# Patient Record
Sex: MALE | Race: WHITE | NOT HISPANIC OR LATINO | Employment: FULL TIME | URBAN - METROPOLITAN AREA
[De-identification: names, ages, dates, MRNs, and addresses within clinical notes are randomized per-mention and may not be internally consistent; named-entity substitution may affect disease eponyms.]

---

## 2017-01-10 ENCOUNTER — ALLSCRIPTS OFFICE VISIT (OUTPATIENT)
Dept: OTHER | Facility: OTHER | Age: 57
End: 2017-01-10

## 2017-01-11 DIAGNOSIS — J32.9 CHRONIC SINUSITIS: ICD-10-CM

## 2017-01-11 DIAGNOSIS — J06.9 ACUTE UPPER RESPIRATORY INFECTION: ICD-10-CM

## 2017-01-14 ENCOUNTER — ALLSCRIPTS OFFICE VISIT (OUTPATIENT)
Dept: OTHER | Facility: OTHER | Age: 57
End: 2017-01-14

## 2017-06-15 ENCOUNTER — ALLSCRIPTS OFFICE VISIT (OUTPATIENT)
Dept: OTHER | Facility: OTHER | Age: 57
End: 2017-06-15

## 2017-06-15 DIAGNOSIS — R53.83 OTHER FATIGUE: ICD-10-CM

## 2017-06-15 DIAGNOSIS — Z12.5 ENCOUNTER FOR SCREENING FOR MALIGNANT NEOPLASM OF PROSTATE: ICD-10-CM

## 2017-06-15 DIAGNOSIS — R05.9 COUGH: ICD-10-CM

## 2017-07-27 ENCOUNTER — APPOINTMENT (OUTPATIENT)
Dept: LAB | Facility: CLINIC | Age: 57
End: 2017-07-27
Payer: COMMERCIAL

## 2017-07-27 ENCOUNTER — APPOINTMENT (OUTPATIENT)
Dept: RADIOLOGY | Facility: CLINIC | Age: 57
End: 2017-07-27
Payer: COMMERCIAL

## 2017-07-27 DIAGNOSIS — R53.83 OTHER FATIGUE: ICD-10-CM

## 2017-07-27 DIAGNOSIS — Z12.5 ENCOUNTER FOR SCREENING FOR MALIGNANT NEOPLASM OF PROSTATE: ICD-10-CM

## 2017-07-27 DIAGNOSIS — R05.9 COUGH: ICD-10-CM

## 2017-07-27 LAB
ALBUMIN SERPL BCP-MCNC: 3.6 G/DL (ref 3.5–5)
ALP SERPL-CCNC: 78 U/L (ref 46–116)
ALT SERPL W P-5'-P-CCNC: 28 U/L (ref 12–78)
ANION GAP SERPL CALCULATED.3IONS-SCNC: 3 MMOL/L (ref 4–13)
AST SERPL W P-5'-P-CCNC: 22 U/L (ref 5–45)
BASOPHILS # BLD AUTO: 0.04 THOUSANDS/ΜL (ref 0–0.1)
BASOPHILS NFR BLD AUTO: 1 % (ref 0–1)
BILIRUB SERPL-MCNC: 0.67 MG/DL (ref 0.2–1)
BUN SERPL-MCNC: 15 MG/DL (ref 5–25)
CALCIUM SERPL-MCNC: 8.8 MG/DL (ref 8.3–10.1)
CHLORIDE SERPL-SCNC: 107 MMOL/L (ref 100–108)
CHOLEST SERPL-MCNC: 198 MG/DL (ref 50–200)
CO2 SERPL-SCNC: 29 MMOL/L (ref 21–32)
CREAT SERPL-MCNC: 1.31 MG/DL (ref 0.6–1.3)
EOSINOPHIL # BLD AUTO: 0.3 THOUSAND/ΜL (ref 0–0.61)
EOSINOPHIL NFR BLD AUTO: 5 % (ref 0–6)
ERYTHROCYTE [DISTWIDTH] IN BLOOD BY AUTOMATED COUNT: 13.4 % (ref 11.6–15.1)
FOLATE SERPL-MCNC: 18.4 NG/ML (ref 3.1–17.5)
GFR SERPL CREATININE-BSD FRML MDRD: 60 ML/MIN/1.73SQ M
GLUCOSE P FAST SERPL-MCNC: 87 MG/DL (ref 65–99)
HCT VFR BLD AUTO: 49.5 % (ref 36.5–49.3)
HDLC SERPL-MCNC: 37 MG/DL (ref 40–60)
HGB BLD-MCNC: 16.5 G/DL (ref 12–17)
LDLC SERPL CALC-MCNC: 145 MG/DL (ref 0–100)
LYMPHOCYTES # BLD AUTO: 1.87 THOUSANDS/ΜL (ref 0.6–4.47)
LYMPHOCYTES NFR BLD AUTO: 28 % (ref 14–44)
MCH RBC QN AUTO: 30.8 PG (ref 26.8–34.3)
MCHC RBC AUTO-ENTMCNC: 33.3 G/DL (ref 31.4–37.4)
MCV RBC AUTO: 92 FL (ref 82–98)
MONOCYTES # BLD AUTO: 0.61 THOUSAND/ΜL (ref 0.17–1.22)
MONOCYTES NFR BLD AUTO: 9 % (ref 4–12)
NEUTROPHILS # BLD AUTO: 3.73 THOUSANDS/ΜL (ref 1.85–7.62)
NEUTS SEG NFR BLD AUTO: 57 % (ref 43–75)
NRBC BLD AUTO-RTO: 0 /100 WBCS
PLATELET # BLD AUTO: 176 THOUSANDS/UL (ref 149–390)
PMV BLD AUTO: 10.8 FL (ref 8.9–12.7)
POTASSIUM SERPL-SCNC: 5 MMOL/L (ref 3.5–5.3)
PROT SERPL-MCNC: 7.9 G/DL (ref 6.4–8.2)
PSA SERPL-MCNC: 0.6 NG/ML (ref 0–4)
RBC # BLD AUTO: 5.36 MILLION/UL (ref 3.88–5.62)
SODIUM SERPL-SCNC: 139 MMOL/L (ref 136–145)
T4 FREE SERPL-MCNC: 0.97 NG/DL (ref 0.76–1.46)
TRIGL SERPL-MCNC: 79 MG/DL
TSH SERPL DL<=0.05 MIU/L-ACNC: 2.74 UIU/ML (ref 0.36–3.74)
VIT B12 SERPL-MCNC: 676 PG/ML (ref 100–900)
WBC # BLD AUTO: 6.58 THOUSAND/UL (ref 4.31–10.16)

## 2017-07-27 PROCEDURE — 70220 X-RAY EXAM OF SINUSES: CPT

## 2017-07-27 PROCEDURE — 82607 VITAMIN B-12: CPT

## 2017-07-27 PROCEDURE — 80061 LIPID PANEL: CPT

## 2017-07-27 PROCEDURE — 84443 ASSAY THYROID STIM HORMONE: CPT

## 2017-07-27 PROCEDURE — 71020 HB CHEST X-RAY 2VW FRONTAL&LATL: CPT

## 2017-07-27 PROCEDURE — 80053 COMPREHEN METABOLIC PANEL: CPT

## 2017-07-27 PROCEDURE — 36415 COLL VENOUS BLD VENIPUNCTURE: CPT

## 2017-07-27 PROCEDURE — 82746 ASSAY OF FOLIC ACID SERUM: CPT

## 2017-07-27 PROCEDURE — 85025 COMPLETE CBC W/AUTO DIFF WBC: CPT

## 2017-07-27 PROCEDURE — 84439 ASSAY OF FREE THYROXINE: CPT

## 2017-07-27 PROCEDURE — G0103 PSA SCREENING: HCPCS

## 2017-08-01 ENCOUNTER — GENERIC CONVERSION - ENCOUNTER (OUTPATIENT)
Dept: OTHER | Facility: OTHER | Age: 57
End: 2017-08-01

## 2017-08-02 ENCOUNTER — ALLSCRIPTS OFFICE VISIT (OUTPATIENT)
Dept: OTHER | Facility: OTHER | Age: 57
End: 2017-08-02

## 2017-08-08 ENCOUNTER — ALLSCRIPTS OFFICE VISIT (OUTPATIENT)
Dept: OTHER | Facility: OTHER | Age: 57
End: 2017-08-08

## 2017-08-08 DIAGNOSIS — R05.9 COUGH: ICD-10-CM

## 2017-08-15 ENCOUNTER — HOSPITAL ENCOUNTER (OUTPATIENT)
Dept: CT IMAGING | Facility: CLINIC | Age: 57
Discharge: HOME | End: 2017-08-15
Payer: COMMERCIAL

## 2017-08-15 DIAGNOSIS — R05.9 COUGH: ICD-10-CM

## 2017-08-15 PROCEDURE — 71250 CT THORAX DX C-: CPT

## 2018-01-11 ENCOUNTER — APPOINTMENT (OUTPATIENT)
Dept: LAB | Facility: CLINIC | Age: 58
End: 2018-01-11
Payer: COMMERCIAL

## 2018-01-11 ENCOUNTER — ALLSCRIPTS OFFICE VISIT (OUTPATIENT)
Dept: OTHER | Facility: OTHER | Age: 58
End: 2018-01-11

## 2018-01-11 ENCOUNTER — GENERIC CONVERSION - ENCOUNTER (OUTPATIENT)
Dept: OTHER | Facility: OTHER | Age: 58
End: 2018-01-11

## 2018-01-11 DIAGNOSIS — J11.1 INFLUENZA DUE TO UNIDENTIFIED INFLUENZA VIRUS WITH OTHER RESPIRATORY MANIFESTATIONS: ICD-10-CM

## 2018-01-11 LAB
BASOPHILS # BLD AUTO: 0.04 THOUSANDS/ΜL (ref 0–0.1)
BASOPHILS NFR BLD AUTO: 1 % (ref 0–1)
EOSINOPHIL # BLD AUTO: 0.32 THOUSAND/ΜL (ref 0–0.61)
EOSINOPHIL NFR BLD AUTO: 6 % (ref 0–6)
ERYTHROCYTE [DISTWIDTH] IN BLOOD BY AUTOMATED COUNT: 13.6 % (ref 11.6–15.1)
HCT VFR BLD AUTO: 45.2 % (ref 36.5–49.3)
HGB BLD-MCNC: 15.2 G/DL (ref 12–17)
LYMPHOCYTES # BLD AUTO: 1.36 THOUSANDS/ΜL (ref 0.6–4.47)
LYMPHOCYTES NFR BLD AUTO: 24 % (ref 14–44)
MCH RBC QN AUTO: 30.5 PG (ref 26.8–34.3)
MCHC RBC AUTO-ENTMCNC: 33.6 G/DL (ref 31.4–37.4)
MCV RBC AUTO: 91 FL (ref 82–98)
MONOCYTES # BLD AUTO: 1.23 THOUSAND/ΜL (ref 0.17–1.22)
MONOCYTES NFR BLD AUTO: 21 % (ref 4–12)
NEUTROPHILS # BLD AUTO: 2.77 THOUSANDS/ΜL (ref 1.85–7.62)
NEUTS SEG NFR BLD AUTO: 48 % (ref 43–75)
NRBC BLD AUTO-RTO: 0 /100 WBCS
PLATELET # BLD AUTO: 198 THOUSANDS/UL (ref 149–390)
PMV BLD AUTO: 11 FL (ref 8.9–12.7)
RBC # BLD AUTO: 4.99 MILLION/UL (ref 3.88–5.62)
S PYO AG THROAT QL: NEGATIVE
WBC # BLD AUTO: 5.77 THOUSAND/UL (ref 4.31–10.16)

## 2018-01-11 PROCEDURE — 36415 COLL VENOUS BLD VENIPUNCTURE: CPT

## 2018-01-11 PROCEDURE — 85025 COMPLETE CBC W/AUTO DIFF WBC: CPT

## 2018-01-12 VITALS
SYSTOLIC BLOOD PRESSURE: 120 MMHG | HEIGHT: 66 IN | WEIGHT: 215.38 LBS | HEART RATE: 53 BPM | OXYGEN SATURATION: 97 % | BODY MASS INDEX: 34.61 KG/M2 | DIASTOLIC BLOOD PRESSURE: 68 MMHG

## 2018-01-12 NOTE — PROGRESS NOTES
Assessment   1  Influenza (487 1) (J11 1)    Plan   Influenza    · (1) CBC/PLT/DIFF; Status:Active; Requested XIU:[id number];    · Rapid StrepA- POC; Source:Throat; Status:Active - Perform Order; Requested    HGH:[id number]; Discussion/Summary      Your symptoms are most likely influenza  Your lungs are clear  Strep culture is negative  Please get a CBC to make sure there are no overt signs of a bacterial infection  a distance of 7 days, Tamiflu will not work  Treat your cell symptomatic lead with Motrin sinus of other decongestant antihistamine products  This should go away within the next 4-5 days  to call back include worsening of cough, higher fever, coughing blood, with developing chest pain or shortness of breath  History of Present Illness   HPI: A one-week history of an acute illness with fevers, chills, myalgias, modest minimally productive cough sweats, and slight sore throat  dyspnea hemoptysis chest pain          Review of Systems        Constitutional: feeling poorly  ENT: sore throat-- and-- nasal discharge, but-- as noted in HPI,-- no earache-- and-- no hearing loss  Cardiovascular: no chest pain-- and-- no palpitations  Respiratory: as noted in HPI--       The patient presents with complaints of sudden onset of intermittent episodes of moderate cough, described as moist  Episodes started about 10 days ago  His symptoms are possibly caused by cold symptoms  Risk Factors: exposure to ill person  Active Problems   1  Abnormal chest x-ray (793 2) (R93 8)   2  Arthritis (716 90) (M19 90)   3  Fatigue (780 79) (R53 83)   4  Screen for colon cancer (V76 51) (Z12 11)   5  Screening for skin condition (V82 0) (Z13 89)   6  Screening PSA (prostate specific antigen) (V76 44) (Z12 5)   7  Seborrheic keratosis (702 19) (L82 1)    Surgical History   Surgical History Reviewed: The surgical history was reviewed and updated today         Social History    · Heterosexual   ·    · Never a smoker   · No drug use   · Occupation   · [address] shows  The social history was reviewed and updated today  Family History   Family History Reviewed: The family history was reviewed and updated today  Current Meds    1  Montelukast Sodium 10 MG Oral Tablet; TAKE 1 TABLET DAILY; Therapy: 18Gsk1237 to (Evaluate:27Ycb9369)  Requested for: 83Rwk3338; Last     Rx:[id number] Ordered     The medication list was reviewed and updated today  Allergies   1  No Known Drug Allergies  2  No Known Environmental Allergies   3  No Known Food Allergies    Vitals    Recorded: 71GMR6287 10:37AM   Temperature 98 2 F   Heart Rate 50   Systolic 796   Diastolic 82   Height 5 ft 6 in   Weight 229 lb    BMI Calculated 36 96   BSA Calculated 2 12   O2 Saturation 96     Physical Exam        Constitutional      General appearance: Abnormal  -- Lower eyelid is a little puffy and he does not look appy but there is no distress, minimal intermittent cough, not tachypneic not dyspneic not using accessory muscles  Eyes      Conjunctiva and lids: Abnormal   Conjunctiva Findings: normal in both eyes  Ears, Nose, Mouth, and Throat      External inspection of ears and nose: Normal        Otoscopic examination: Abnormal  -- Tympanic membrane slightly dull but no redness  Nasal mucosa, septum, and turbinates: Abnormal   There was clear rhinorrhea from both nares  The bilateral nasal mucosa was boggy  Oropharynx: Abnormal   The posterior pharynx was erythematous, but-- did not have an exudate  Pulmonary      Respiratory effort: No increased work of breathing or signs of respiratory distress  Auscultation of lungs: Clear to auscultation, equal breath sounds bilaterally, no wheezes, no rales, no rhonci  Cardiovascular      Auscultation of heart: Normal rate and rhythm, normal S1 and S2, without murmurs         Musculoskeletal      Gait and station: Normal           Future Appointments Date/Time Provider Specialty Site   08/06/2018 04:15 PM KENAN Gloria   Dermatology [facility]     Signatures    Electronically signed by : KENAN Gonzalez ; Jan 11 2018 10:54AM EST                       (Author)

## 2018-01-14 VITALS
TEMPERATURE: 98.1 F | BODY MASS INDEX: 34.93 KG/M2 | DIASTOLIC BLOOD PRESSURE: 88 MMHG | SYSTOLIC BLOOD PRESSURE: 118 MMHG | HEART RATE: 64 BPM | OXYGEN SATURATION: 95 % | WEIGHT: 217.38 LBS | RESPIRATION RATE: 18 BRPM | HEIGHT: 66 IN

## 2018-01-14 VITALS
DIASTOLIC BLOOD PRESSURE: 90 MMHG | SYSTOLIC BLOOD PRESSURE: 138 MMHG | TEMPERATURE: 97.6 F | WEIGHT: 220.13 LBS | HEART RATE: 60 BPM

## 2018-01-14 VITALS
BODY MASS INDEX: 34.93 KG/M2 | SYSTOLIC BLOOD PRESSURE: 130 MMHG | OXYGEN SATURATION: 97 % | HEIGHT: 66 IN | HEART RATE: 50 BPM | WEIGHT: 217.38 LBS | DIASTOLIC BLOOD PRESSURE: 90 MMHG

## 2018-01-14 NOTE — PROGRESS NOTES
Assessment    1  Family history of bacterial meningitis (V18 8) (Z83 1) : Mother   2  Family history of  : Mother, Sister   3  Family history of Leukemia, acute : Sister   3  Rash (782 1) (R21)   5  Screen for colon cancer (V76 51) (Z12 11)   6  Screening PSA (prostate specific antigen) (V76 44) (Z12 5)   7  History of Umbilical Hernia Herniorrhaphy   8  No drug use   9  Occupation   · [employer]   8  Arthritis (716 90) (M19 90)    Plan  Rash    · 1 - Annette JOSE, Jhoana Lambert  (Dermatology) Physician Referral  Consult  Status: Hold For -  Scheduling  Requested for: 66HQX0810  Care Summary provided  : Yes  Screen for colon cancer    · COLONOSCOPY; Status:Active; Requested for:2016;   Screening PSA (prostate specific antigen)    · (1) PSA (SCREEN) (Dx V76 44 Screen for Prostate Cancer); Status:Active; Requested  for:2016;     Discussion/Summary  Impression: health maintenance visit  Currently, he eats a healthy diet  Prostate cancer screening: PSA testing is needed every year  Colorectal cancer screening: colonoscopy has been ordered  The risks and benefits of immunizations were discussed  Advice and education were given regarding aerobic exercise  Patient discussion: discussed with the patient  The patient was counseled regarding  History of Present Illness  HM, Adult Male: The patient is being seen for a health maintenance evaluation  The last health maintenance visit was 1 year(s) ago  Social History: Household members include spouse  He is   Work status: working full time  The patient has never smoked cigarettes  He reports occasional alcohol use  The patient has no concerns about alcohol abuse  He has never used illicit drugs  General Health: The patient's health since the last visit is described as good  He has regular dental visits  He denies vision problems  He denies hearing loss  Lifestyle:  He consumes a diverse and healthy diet   He does not have any weight concerns  He does not exercise regularly  He does not use tobacco  He consumes alcohol  He denies drug use  Reproductive health:  the patient is sexually active  Screening: cancer screening reviewed and updated  metabolic screening reviewed and current  risk screening reviewed and current  HPI: New patient assessment of a 50-year-old man  Recently admitted to [facility] with an atypical chest pain  He had negative cardiac enzymes, a normal cardiogram, and normal echocardiogram, and a normal stress test   Reports a rash noted on the dorsal aspect of the right hand for several months and also a rash on the left side of the neck  Last year, he was treated for what sounds like scabies after he stayed in a motel and developed a rash which was treated by a dermatologist and scabies  He is developing osteoarthrosis and and has complaint of right knee pain along with a fairly chronic paralumbar back pain and some pain in both index fingers  Examination shows early Heberden nodules  Review of Systems    Constitutional: no fever and no chills  ENT: no nosebleeds and no nasal discharge  Cardiovascular: no chest pain and no palpitations  Respiratory: no cough and no shortness of breath during exertion  Gastrointestinal: no abdominal pain, no nausea and no diarrhea  Genitourinary: no dysuria, no urinary hesitancy and no nocturia  Musculoskeletal: arthralgias, but as noted in HPI, no joint swelling and no joint stiffness  Integumentary: a rash, but as noted in HPI and no skin lesions  Neurological: no headache and no fainting  Psychiatric: no anxiety and no depression        Surgical History    · History of Umbilical Hernia Herniorrhaphy    Family History    · Family history of    · Family history of bacterial meningitis (V18 8) (Z83 1)    · Family history of    · Family history of Leukemia, acute    Social History    · Heterosexual   ·    · Never a smoker   · No drug use   · Occupation   · [employer]    Current Meds   1  [employer] Daily Multiple Oral Tablet; Therapy: (Recorded:14Mar2016) to Recorded    Allergies    1  No Known Drug Allergies    Vitals   Recorded: 28DOM0374 07:47UW   Systolic 825   Diastolic 80   Height 5 ft 6 in   Weight 206 lb    BMI Calculated 33 25   BSA Calculated 2 03     Physical Exam    Constitutional   General appearance: No acute distress, well appearing and well nourished  Eyes   Conjunctiva and lids: No swelling, erythema, or discharge  Pupils and irises: Equal, round and reactive to light  Ears, Nose, Mouth, and Throat   External inspection of ears and nose: Normal     Otoscopic examination: Tympanic membrance translucent with normal light reflex  Canals patent without erythema  Oropharynx: Normal with no erythema, edema, exudate or lesions  Pulmonary   Respiratory effort: No increased work of breathing or signs of respiratory distress  Auscultation of lungs: Clear to auscultation  Cardiovascular   Palpation of heart: Normal PMI, no thrills  Auscultation of heart: Normal rate and rhythm, normal S1 and S2, without murmurs  Examination of extremities for edema and/or varicosities: Normal     Abdomen   Abdomen: Non-tender, no masses  Liver and spleen: No hepatomegaly or splenomegaly  Lymphatic   Palpation of lymph nodes in neck: No lymphadenopathy  Musculoskeletal   Gait and station: Normal     Digits and nails: Normal without clubbing or cyanosis  Inspection/palpation of joints, bones, and muscles: Normal     Skin   Skin and subcutaneous tissue: Normal without rashes or lesions  Neurologic   Cranial nerves: Cranial nerves 2-12 intact  Reflexes: 2+ and symmetric  Sensation: No sensory loss      Psychiatric   Orientation to person, place and time: Normal     Mood and affect: Normal        Results/Data  COLONOSCOPY [id number] 05:35PM      Test Name Result Flag Reference   Colonoscopy never had Signatures   Electronically signed by : KENAN Fuentes ; Mar 14 2016  6:24PM EST                       (Author)

## 2018-01-15 ENCOUNTER — ALLSCRIPTS OFFICE VISIT (OUTPATIENT)
Dept: OTHER | Facility: OTHER | Age: 58
End: 2018-01-15

## 2018-01-17 NOTE — PROGRESS NOTES
Assessment   1  Acute maxillary sinusitis, recurrence not specified (461 0) (J01 00)   2  Influenza (487 1) (J11 1)    Plan   Acute maxillary sinusitis, recurrence not specified    · Acetaminophen-Codeine #3 300-30 MG Oral Tablet; TAKE 1 TABLET 3 TIMES DAILY AS    NEEDED FOR PAIN   · Azithromycin 250 MG Oral Tablet; TAKE 2 TABLETS ON DAY 1 THEN TAKE 1 TABLET A DAY    FOR 4 DAYS   · Sudafed 30 MG Oral Tablet; TAKE 1 TABLET EVERY 4 TO 6 HOURS AS NEEDED   · Follow Up if Not Better Evaluation and Treatment  Follow-up  Status: Complete  Done: 03YEB0103    Discussion/Summary   Discussion Summary:    Antibiotics Sudafed anti-inflammatories return if he worsens  Medication SE Review and Pt Understands Tx: Possible side effects of new medications were reviewed with the patient/guardian today  The treatment plan was reviewed with the patient/guardian  The patient/guardian understands and agrees with the treatment plan      Chief Complaint   Chief Complaint Free Text Note Form: Facial pain coughing      History of Present Illness   HPI: He was seen here last week with a fever aching to be influenza  Now has developed pain on the right side of his face right periorbital region and coughing up yellowish phlegm no hemoptysis or chest pain still feels very tired and achy no chills his appetite has been okay no falls or syncope  From last week is CBC was normal his rapid flu was negative    Sinusitis (Brief): The patient is being seen for a routine clinic follow-up of sinusitis  The sinusitis involves the maxillary sinuses  The sinusitis is classified as acute  facial pain      Associated symptoms:   No associated symptoms are reported  Influenza, Adult (Brief): The patient is being seen for a routine clinic follow-up of influenza  Symptoms:  fever,-- chills,-- malaise,-- myalgias,-- weakness,-- headache,-- nasal congestion,-- rhinorrhea,-- sore throat,-- cough-- and-- anorexia, but-- no shortness of breath   No associated symptoms are reported  Review of Systems   Complete-Male:      Constitutional: No fever or chills, feels well, no tiredness, no recent weight gain or weight loss  Eyes: No complaints of eye pain, no red eyes, no discharge from eyes, no itchy eyes  ENT: as noted in HPI  Cardiovascular: No complaints of slow heart rate, no fast heart rate, no chest pain, no palpitations, no leg claudication, no lower extremity  Respiratory: No complaints of shortness of breath, no wheezing, no cough, no SOB on exertion, no orthopnea or PND  Gastrointestinal: No complaints of abdominal pain, no constipation, no nausea or vomiting, no diarrhea or bloody stools  Genitourinary: No complaints of dysuria, no incontinence, no hesitancy, no nocturia, no genital lesion, no testicular pain  Musculoskeletal: No complaints of arthralgia, no myalgias, no joint swelling or stiffness, no limb pain or swelling  Integumentary: No complaints of skin rash or skin lesions, no itching, no skin wound, no dry skin  Neurological: No compliants of headache, no confusion, no convulsions, no numbness or tingling, no dizziness or fainting, no limb weakness, no difficulty walking  Psychiatric: Is not suicidal, no sleep disturbances, no anxiety or depression, no change in personality, no emotional problems  Endocrine: No complaints of proptosis, no hot flashes, no muscle weakness, no erectile dysfunction, no deepening of the voice, no feelings of weakness  Hematologic/Lymphatic: No complaints of swollen glands, no swollen glands in the neck, does not bleed easily, no easy bruising  ROS Reviewed:    ROS reviewed  Active Problems   1  Abnormal chest x-ray (793 2) (R93 8)   2  Arthritis (716 90) (M19 90)   3  Fatigue (780 79) (R53 83)   4  Influenza (487 1) (J11 1)   5  Screen for colon cancer (V76 51) (Z12 11)   6  Screening for skin condition (V82 0) (Z13 89)   7   Screening PSA (prostate specific antigen) (V76 44) (Z12 5)   8  Seborrheic keratosis (702 19) (L82 1)    Past Medical History   Active Problems And Past Medical History Reviewed: The active problems and past medical history were reviewed and updated today  Surgical History   1  History of Umbilical Hernia Herniorrhaphy  Surgical History Reviewed: The surgical history was reviewed and updated today  Family History   Mother    1  Family history of    2  Family history of bacterial meningitis (V18 8) (Z83 1)  Sister    3  Family history of    4  Family history of Leukemia, acute  Family History Reviewed: The family history was reviewed and updated today  Social History    · Heterosexual   ·    · Never a smoker   · No drug use   · Occupation  Social History Reviewed: The social history was reviewed and updated today  Current Meds    1  Motrin  MG Oral Tablet; Therapy: 59YDX8373 to Recorded  Medication List Reviewed: The medication list was reviewed and updated today  Allergies   1  No Known Drug Allergies  2  No Known Environmental Allergies   3  No Known Food Allergies    Vitals   Vital Signs    Recorded: 12OEL4139 11:21AM   Temperature 100 8 F   Heart Rate 67   Systolic 743   Diastolic 84   Height 5 ft 6 in   Weight 221 lb 6 oz   BMI Calculated 35 73   BSA Calculated 2 09   O2 Saturation 94     Physical Exam        Constitutional      General appearance: Abnormal   acutely ill  Eyes      Conjunctiva and lids: No swelling, erythema, or discharge  Pupils and irises: Equal, round and reactive to light  Ears, Nose, Mouth, and Throat      External inspection of ears and nose: Normal        Otoscopic examination: Tympanic membrance translucent with normal light reflex  Canals patent without erythema  Nasal mucosa, septum, and turbinates: Abnormal  -- Tenderness over the right maxillary sinus        Oropharynx: Normal with no erythema, edema, exudate or lesions  Pulmonary      Respiratory effort: No increased work of breathing or signs of respiratory distress  Auscultation of lungs: Clear to auscultation, equal breath sounds bilaterally, no wheezes, no rales, no rhonci  Cardiovascular      Palpation of heart: Normal PMI, no thrills  Auscultation of heart: Normal rate and rhythm, normal S1 and S2, without murmurs  Examination of extremities for edema and/or varicosities: Normal        Carotid pulses: Normal        Abdomen      Abdomen: Non-tender, no masses  Liver and spleen: No hepatomegaly or splenomegaly  Lymphatic      Palpation of lymph nodes in neck: No lymphadenopathy  Musculoskeletal      Gait and station: Normal        Digits and nails: Normal without clubbing or cyanosis  Inspection/palpation of joints, bones, and muscles: Normal        Skin      Skin and subcutaneous tissue: Normal without rashes or lesions  Neurologic      Cranial nerves: Cranial nerves 2-12 intact  Reflexes: 2+ and symmetric  Sensation: No sensory loss  Psychiatric      Orientation to person, place and time: Normal        Mood and affect: Normal           Future Appointments      Date/Time Provider Specialty Site   08/06/2018 04:15 PM KENAN Ndiaye   Dermatology [facility]     Signatures    Electronically signed by : Cong Neves, [facility]; Trever 15 2018  6:35PM EST                       (Author)     Electronically signed by : KENAN Joyce ; Jan 16 2018 12:50PM EST

## 2018-01-17 NOTE — RESULT NOTES
Verified Results  * XR CHEST PA & LATERAL [id number] 09:22AM Ilyaliza Charlotte Order Number: [id number]     Test Name Result Flag Reference   XR CHEST PA & LATERAL (Report)     CHEST      INDICATION: Shortness of breath, congestion, wheezing  Chest tightness  Cough for 5 months  COMPARISON: None     VIEWS: Frontal and lateral projections     IMAGES: 2     FINDINGS:        Heart shadow appears unremarkable  Atherosclerotic vascular calcifications are noted  The lungs are clear  Lingular scarring or atelectasis  No pneumothorax or pleural effusion  Age-appropriate degenerative changes are noted in the spine  Osseous structures appear otherwise within normal limits  IMPRESSION:   Lingular scarring or atelectasis  No active pulmonary disease  The chronicity of symptoms, consider follow-up CT scan of the chest for further evaluation  Workstation performed: [id number]     Signed by:   Michelle Tom DO   7/29/17     * XR SINUSES ROUTINE 3+ VIEWS [id number] 09:22AM Ilyaliza Charlotte Order Number: [id number]     Test Name Result Flag Reference   XR SINUSES ROUTINE 3+ VIEWS (Report)     PARANASAL SINUSES     INDICATION: Cough and congestion  Headaches  Pressure  COMPARISON: None     VIEWS: 3     IMAGES: 3     FINDINGS:     No evidence of sinus opacification or air-fluid levels  No lytic or blastic lesions are identified  IMPRESSION:     No evidence of sinusitis         Workstation performed: [id number]     Signed by:   Michelle Tom DO   7/29/17

## 2018-01-22 VITALS
TEMPERATURE: 98.2 F | HEART RATE: 50 BPM | WEIGHT: 229 LBS | SYSTOLIC BLOOD PRESSURE: 126 MMHG | HEIGHT: 66 IN | OXYGEN SATURATION: 96 % | BODY MASS INDEX: 36.8 KG/M2 | DIASTOLIC BLOOD PRESSURE: 82 MMHG

## 2018-01-23 VITALS
WEIGHT: 221.38 LBS | SYSTOLIC BLOOD PRESSURE: 132 MMHG | HEART RATE: 67 BPM | BODY MASS INDEX: 35.58 KG/M2 | TEMPERATURE: 100.8 F | HEIGHT: 66 IN | DIASTOLIC BLOOD PRESSURE: 84 MMHG | OXYGEN SATURATION: 94 %

## 2018-08-14 ENCOUNTER — OFFICE VISIT (OUTPATIENT)
Dept: DERMATOLOGY | Facility: CLINIC | Age: 58
End: 2018-08-14
Payer: COMMERCIAL

## 2018-08-14 DIAGNOSIS — Z13.89 SCREENING FOR SKIN CONDITION: ICD-10-CM

## 2018-08-14 DIAGNOSIS — L82.1 SEBORRHEIC KERATOSIS: Primary | ICD-10-CM

## 2018-08-14 PROCEDURE — 99213 OFFICE O/P EST LOW 20 MIN: CPT | Performed by: DERMATOLOGY

## 2018-08-14 NOTE — PROGRESS NOTES
[address]  [address]  [facility] [id number]  [phone]  [phone]     MRN: [id number] : 1960  Encounter: [id number]  Patient Information: Belem Caban  Chief complaint:Yearly checkup    History of present illness:  58-year-old male presents for overall skin check concerned regarding potential skin cancer again which discusses lesion on the right side of his cheek  No past medical history on file  No past surgical history on file  Social History   History   Alcohol use Not on file     History   Drug use: Unknown     History   Smoking Status    Not on file   Smokeless Tobacco    Not on file     No family history on file    Meds/Allergies   No Known Allergies    Meds:  Prior to Admission medications    Not on File       Subjective:     Review of Systems:    General: negative for - chills, fatigue, fever,  weight gain or weight loss  Psychological: negative for - anxiety, behavioral disorder, concentration difficulties, decreased libido, depression, irritability, memory difficulties, mood swings, sleep disturbances or suicidal ideation  ENT: negative for - hearing difficulties , nasal congestion, nasal discharge, oral lesions, sinus pain, sneezing, sore throat  Allergy and Immunology: negative for - hives, insect bite sensitivity,  Hematological and Lymphatic: negative for - bleeding problems, blood clots,bruising, swollen lymph nodes  Endocrine: negative for - hair pattern changes, hot flashes, malaise/lethargy, mood swings, palpitations, polydipsia/polyuria, skin changes, temperature intolerance or unexpected weight change  Respiratory: negative for - cough, hemoptysis, orthopnea, shortness of breath, or wheezing  Cardiovascular: negative for - chest pain, dyspnea on exertion, edema,  Gastrointestinal: negative for - abdominal pain, nausea/vomiting  Genito-Urinary: negative for - dysuria, incontinence, irregular/heavy menses or urinary frequency/urgency  Musculoskeletal: negative for - gait disturbance, joint pain, joint stiffness, joint swelling, muscle pain, muscular weakness  Dermatological:  As in HPI  Neurological: negative for confusion, dizziness, headaches, impaired coordination/balance, memory loss, numbness/tingling, seizures, speech problems, tremors or weakness       Objective: There were no vitals taken for this visit  Physical Exam:    General Appearance:    Alert, cooperative, no distress   Head:    Normocephalic, without obvious abnormality, atraumatic           Skin:   A full skin exam was performed including scalp, head scalp, eyes, ears, nose, lips, neck, chest, axilla, abdomen, back, buttocks, bilateral upper extremities, bilateral lower extremities, hands, feet, fingers, toes, fingernails, and toenails  Normal keratotic papules with greasy stuck on appearance nothing else remarkable noted on complete exam     Assessment:     1  Seborrheic keratosis     2  Screening for skin condition           Plan:   Seborrheic Keratosis  Patient reasurred these are normal growths we acquire with age no treatment needed  Screening for Dermatologic Disorders: Nothing else of concern noted on complete exam follow up in 1 year       Jimy Morrell MD  7/93/5856,4:44 PM    Portions of the record may have been created with voice recognition software   Occasional wrong word or "sound a like" substitutions may have occurred due to the inherent limitations of voice recognition software   Read the chart carefully and recognize, using context, where substitutions have occurred

## 2018-08-22 ENCOUNTER — OFFICE VISIT (OUTPATIENT)
Dept: INTERNAL MEDICINE CLINIC | Facility: CLINIC | Age: 58
End: 2018-08-22
Payer: COMMERCIAL

## 2018-08-22 VITALS
DIASTOLIC BLOOD PRESSURE: 76 MMHG | BODY MASS INDEX: 35.03 KG/M2 | SYSTOLIC BLOOD PRESSURE: 110 MMHG | HEART RATE: 56 BPM | WEIGHT: 223.2 LBS | OXYGEN SATURATION: 95 % | HEIGHT: 67 IN

## 2018-08-22 DIAGNOSIS — M54.2 CERVICALGIA: Primary | ICD-10-CM

## 2018-08-22 PROCEDURE — 99213 OFFICE O/P EST LOW 20 MIN: CPT | Performed by: INTERNAL MEDICINE

## 2018-08-22 RX ORDER — CELECOXIB 200 MG/1
200 CAPSULE ORAL DAILY
Qty: 30 CAPSULE | Refills: 0 | Status: SHIPPED | OUTPATIENT
Start: 2018-08-22 | End: 2020-10-13 | Stop reason: CLARIF

## 2018-08-22 NOTE — PROGRESS NOTES
Assessment/Plan:       There are no diagnoses linked to this encounter  There are no Patient Instructions on file for this visit  Subjective:      Patient ID: Belem Caban is a 60 y o  male  A 6 month history of pain felt at the base of the neck, central, and a bit to the right of the midline  It began insidiously 6 months ago but in the past month has accelerated so the by now the pain is more intention and also there fairly constantly  The pain is felt is more [place] pressure but occasionally sharp sensation  No radiation  The patient here in the office at this moment identifies twisting the neck as an exacerbating factor but says he played golf yesterday with no problem  There was no obvious precipitant  A rather vaguely stated complaint of associated tingling and numbness of all 10 finger tips noted occasionally without any particular exacerbating or relieving factor  No systemic symptoms of any kind    12 point ROS otherwise normal        The following portions of the patient's history were reviewed and updated as appropriate:   He has no past medical history on file  ,   does not have any pertinent problems on file  ,   has no past surgical history on file  ,  family history is not on file  ,   reports that he has never smoked  He has never used smokeless tobacco  He reports that he drinks alcohol  He reports that he does not use drugs  ,  has No Known Allergies     No current outpatient prescriptions on file  No current facility-administered medications for this visit  Review of Systems   Constitutional: Negative for chills and fever  HENT: Negative for sore throat and trouble swallowing  Eyes: Negative for pain  Respiratory: Negative for cough and shortness of breath  Cardiovascular: Negative for chest pain and palpitations  Gastrointestinal: Negative for abdominal pain, blood in stool, diarrhea, nausea and vomiting     Endocrine: Negative for cold intolerance and heat intolerance  Genitourinary: Negative for dysuria, frequency and testicular pain  Musculoskeletal: Positive for neck pain  Negative for arthralgias and joint swelling  Allergic/Immunologic: Negative for immunocompromised state  Neurological: Positive for numbness  Negative for dizziness, syncope and headaches  Hematological: Negative for adenopathy  Does not bruise/bleed easily  Psychiatric/Behavioral: Negative for dysphoric mood  The patient is not nervous/anxious  Objective:  Vitals:    08/22/18 1844   BP: 110/76   Pulse: 56   SpO2: 95%      Physical Exam   Constitutional: He appears well-developed and well-nourished  Neck: Normal range of motion  Neck supple  Cardiovascular: Normal rate  Pulmonary/Chest: Effort normal  No respiratory distress  Musculoskeletal: He exhibits tenderness  He exhibits no deformity  Minimal tenderness to palpation of the zone immediately adjacent to the midline at the level of the junction of C7-T1  Lymphadenopathy:     He has no cervical adenopathy  Neurological: He is alert  Psychiatric: He has a normal mood and affect   Judgment normal

## 2018-08-22 NOTE — PATIENT INSTRUCTIONS
By now chronic neck pain of unknown cause  Starting point will be C-spine x-ray  Celebrex daily for pain control  Call me to review the x-ray result    I suspect he will need an MRI

## 2018-08-23 ENCOUNTER — HOSPITAL ENCOUNTER (OUTPATIENT)
Dept: RADIOLOGY | Facility: HOSPITAL | Age: 58
Discharge: HOME | End: 2018-08-23
Attending: INTERNAL MEDICINE
Payer: COMMERCIAL

## 2018-08-23 DIAGNOSIS — M54.2 CERVICALGIA: ICD-10-CM

## 2018-08-23 PROCEDURE — 72050 X-RAY EXAM NECK SPINE 4/5VWS: CPT

## 2018-09-05 ENCOUNTER — OFFICE VISIT (OUTPATIENT)
Dept: INTERNAL MEDICINE CLINIC | Facility: CLINIC | Age: 58
End: 2018-09-05
Payer: COMMERCIAL

## 2018-09-05 VITALS
BODY MASS INDEX: 35.16 KG/M2 | DIASTOLIC BLOOD PRESSURE: 80 MMHG | HEIGHT: 67 IN | WEIGHT: 224 LBS | OXYGEN SATURATION: 97 % | SYSTOLIC BLOOD PRESSURE: 120 MMHG | HEART RATE: 57 BPM | TEMPERATURE: 97.7 F

## 2018-09-05 DIAGNOSIS — M54.2 CERVICALGIA: Primary | ICD-10-CM

## 2018-09-05 PROCEDURE — 99214 OFFICE O/P EST MOD 30 MIN: CPT | Performed by: PHYSICIAN ASSISTANT

## 2018-09-05 PROCEDURE — 3008F BODY MASS INDEX DOCD: CPT | Performed by: PHYSICIAN ASSISTANT

## 2018-09-05 NOTE — PROGRESS NOTES
Assessment/Plan:  Limited motion in all directions of his neck  Reviewed his x-ray which shows some degenerative change he is referred to [facility] for further evaluation and management     Diagnoses and all orders for this visit:    Cervicalgia  -     Ambulatory Referral to [facility]; Future        No problem-specific Assessment & Plan notes found for this encounter  Subjective:      Patient ID: Pk Tavares is a 60 y o  male  Ongoing problem with stiff painful neck  It hurts him all the time he is reluctant to take anti-inflammatories but when he does take Motrin it seems to help  His symptoms seem to be getting worse and her going on over 6 months  No real radicular signs or symptoms  Occasionally some vague numbness of his fingertips but no lateralizing weakness numbness or tingling no pain in his arms  Recent cervical spine x-ray showed degeneration          The following portions of the patient's history were reviewed and updated as appropriate:   He has no past medical history on file  ,   does not have any pertinent problems on file  ,   has a past surgical history that includes Umbilical hernia repair  ,  family history includes Leukemia in his sister; Other in his mother  ,   reports that he has never smoked  He has never used smokeless tobacco  He reports that he drinks alcohol  He reports that he does not use drugs  ,  has No Known Allergies     Current Outpatient Prescriptions   Medication Sig Dispense Refill    celecoxib (CeleBREX) 200 mg capsule Take 1 capsule (200 mg total) by mouth daily (Patient not taking: Reported on 9/5/2018 ) 30 capsule 0     No current facility-administered medications for this visit  Review of Systems   Constitutional: Negative for activity change, appetite change, chills, diaphoresis, fatigue, fever and unexpected weight change     HENT: Negative for congestion, dental problem, drooling, ear discharge, ear pain, facial swelling, hearing loss, nosebleeds, postnasal drip, rhinorrhea, sinus pain, sinus pressure, sneezing, sore throat, tinnitus, trouble swallowing and voice change  Eyes: Negative for photophobia, pain, discharge, redness, itching and visual disturbance  Respiratory: Negative for apnea, cough, choking, chest tightness, shortness of breath, wheezing and stridor  Cardiovascular: Negative for chest pain, palpitations and leg swelling  Gastrointestinal: Negative for abdominal distention, abdominal pain, anal bleeding, blood in stool, constipation, diarrhea, nausea, rectal pain and vomiting  Endocrine: Negative for cold intolerance, heat intolerance, polydipsia, polyphagia and polyuria  Genitourinary: Negative for decreased urine volume, difficulty urinating, dysuria, enuresis, flank pain, frequency, genital sores, hematuria and urgency  Musculoskeletal: Positive for neck pain and neck stiffness  Negative for arthralgias, back pain, gait problem, joint swelling and myalgias  Skin: Negative for color change, pallor, rash and wound  Neurological: Negative for dizziness, tremors, seizures, syncope, facial asymmetry, speech difficulty, weakness, light-headedness, numbness and headaches  Hematological: Negative for adenopathy  Does not bruise/bleed easily  Psychiatric/Behavioral: Negative for agitation, behavioral problems, confusion, decreased concentration, dysphoric mood, hallucinations, self-injury, sleep disturbance and suicidal ideas  The patient is not nervous/anxious and is not hyperactive  Objective:  Vitals:    09/05/18 1707   BP: 120/80   BP Location: Left arm   Patient Position: Sitting   Pulse: 57   Temp: 97 7 °F (36 5 °C)   SpO2: 97%   Weight: 102 kg (224 lb)   Height: 5' 7" (1 702 m)     Body mass index is 35 08 kg/m²  Physical Exam   Constitutional: He is oriented to person, place, and time  He appears well-developed  HENT:   Head: Normocephalic     Right Ear: External ear normal  Left Ear: External ear normal    Mouth/Throat: Oropharynx is clear and moist    Eyes: Conjunctivae are normal  Pupils are equal, round, and reactive to light  Neck: Neck supple  No thyromegaly present  Cardiovascular: Normal rate, regular rhythm, normal heart sounds and intact distal pulses  Pulmonary/Chest: Effort normal and breath sounds normal    Abdominal: Soft  Bowel sounds are normal    Musculoskeletal: Normal range of motion  He exhibits tenderness (Limited motion of his neck in all directions due to pain)  He exhibits no edema or deformity  Neurological: He is alert and oriented to person, place, and time  He has normal reflexes  He displays normal reflexes  No cranial nerve deficit  He exhibits normal muscle tone  Coordination normal    Skin: Skin is warm and dry

## 2018-09-06 ENCOUNTER — TELEPHONE (OUTPATIENT)
Dept: PHYSICAL THERAPY | Facility: OTHER | Age: 58
End: 2018-09-06

## 2018-09-06 ENCOUNTER — NURSE TRIAGE (OUTPATIENT)
Dept: PHYSICAL THERAPY | Facility: OTHER | Age: 58
End: 2018-09-06

## 2018-09-06 DIAGNOSIS — M54.2 NECK PAIN: Primary | ICD-10-CM

## 2018-09-06 NOTE — TELEPHONE ENCOUNTER
Background - Initial Assessment  Clinical complaint: neck pain, primarily right sided with movement  Date of onset: 2 months ago  Mechanism of injury: unk    Previous Treatment - Previous Treatment  Previous evaluation: none  Current provider: PCP  Diagnostics: XRAYs  Previous treatment: none    Protocols used:  AMB [address] 1    Patient reports having a really bad migraine two weeks ago  "I do not get migraines often  Alcohol tiggers them"    Patient states motrin helps a little with the pain  Was able to golf 9 holes Sat,  Sun and Mon  Patient reports he is sleeping without difficulty  Does not affect his ability to sleep

## 2018-09-10 ENCOUNTER — EVALUATION (OUTPATIENT)
Dept: PHYSICAL THERAPY | Facility: CLINIC | Age: 58
End: 2018-09-10
Payer: COMMERCIAL

## 2018-09-10 VITALS — DIASTOLIC BLOOD PRESSURE: 84 MMHG | TEMPERATURE: 98.5 F | SYSTOLIC BLOOD PRESSURE: 126 MMHG

## 2018-09-10 DIAGNOSIS — M54.2 NECK PAIN: Primary | ICD-10-CM

## 2018-09-10 PROCEDURE — G8981 BODY POS CURRENT STATUS: HCPCS | Performed by: PHYSICAL THERAPIST

## 2018-09-10 PROCEDURE — 97162 PT EVAL MOD COMPLEX 30 MIN: CPT | Performed by: PHYSICAL THERAPIST

## 2018-09-10 PROCEDURE — G8982 BODY POS GOAL STATUS: HCPCS | Performed by: PHYSICAL THERAPIST

## 2018-09-10 PROCEDURE — 97112 NEUROMUSCULAR REEDUCATION: CPT | Performed by: PHYSICAL THERAPIST

## 2018-09-10 PROCEDURE — 97140 MANUAL THERAPY 1/> REGIONS: CPT | Performed by: PHYSICAL THERAPIST

## 2018-09-10 NOTE — PROGRESS NOTES
PT Evaluation     Today's date: 9/10/2018  Patient name: Theodor Osgood  : 1960  MRN: [id number]  Referring provider: Vish Mullins MD  Dx:   Encounter Diagnosis     ICD-10-CM    1  Neck pain M54 2                   Assessment  Impairments: abnormal or restricted ROM, pain with function and poor posture   Functional limitations: Pain looking over shoulder while driving  Pain holding phone to his ear  Assessment details: Patient is a 60 y o  Male referred with recent exacerbation of chronic neck pain  He has pain localized to C7-T1 junction and demonstrates limited cervical ROM, painful into bilateral rotations  He also has decreased mobility in cervical spine and will benefit from manual therapy techniques to improve his mobility and decrease pain to promote his ability look over his shoulder when driving  He will also benefit from stabilization exercises to improve posture and improve endurance of his neck musculature  Understanding of Dx/Px/POC: good   Prognosis: good    Goals  STG (2 weeks)  1  Patient will report pain decreased to a 2/10 at worst with working at the computer  2  Patient will demonstrate cervical ROM to [employer] in order to look over shoulder while driving  LTG (4 weeks)  1  Patient will report pain as a 0-1/10 at worst with looking over shoulder while driving  2  Patient will demonstrate improved postural awareness with sitting and standing to minimize strain on spine      Plan  Patient would benefit from: PT eval  Planned modality interventions: thermotherapy: hydrocollator packs  Planned therapy interventions: joint mobilization, manual therapy, postural training, patient education, home exercise program, therapeutic exercise, therapeutic activities, stretching, strengthening, body mechanics training and neuromuscular re-education  Frequency: 2x week  Duration in weeks: 4  Plan of Care beginning date: 9/10/2018  Plan of Care expiration date: 10/8/2018  Treatment plan discussed with: patient        Subjective Evaluation    History of Present Illness  Date of onset: 7/10/2018  Mechanism of injury: Patient states he felt a "tinge" of pain in February, but symptoms subsided  He reports about two months ago the neck pain got worse  Symptoms have been staying the same since onset  X-rays of cervical spine show multilevel spondylitic changes  He is referred now to [facility]pat[facility] PT services as part of the comprehensive spine program   Quality of life: good    Pain  No pain reported  Current pain ratin  At best pain ratin  At worst pain ratin  Location: C7 spinous process and infrequently into bilaterl UT  Quality: sharp  Relieving factors: heat  Progression: worsening    Social Support    Employment status: working ( 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 )  Exercise history: Has not been exercising for 1 5 years  Diagnostic Tests  X-ray: abnormal (Multilevel spondylitic changes)    FCE comments: Patient reports neck gets fatigued by the end of the day and pain gets worse  Hot shower helps to relieve pain  Denies weakness and paresthesias into bilateral UE  Still performs all normal activities  Denies functional limitations  Difficulty looking over right shoulder and relies on mirrors to back up  Feels his mobility is getting worse  Treatments  Previous treatment: medication        Objective     Palpation   Left   No palpable tenderness to the upper trapezius  Right   No palpable tenderness to the upper trapezius  Tenderness   Cervical Spine   Tenderness in the spinous process (C3-T1)       Neurological Testing     Sensation   Cervical/Thoracic   Left   Intact: light touch    Right   Intact: light touch    Reflexes   Left   Biceps (C5/C6): normal (2+)  Brachioradialis (C6): normal (2+)  Triceps (C7): normal (2+)    Right   Biceps (C5/C6): normal (2+)  Brachioradialis (C6): normal (2+)  Triceps (C7): normal (2+)    Active Range of Motion Cervical/Thoracic Spine   Cervical    Flexion: 48 degrees   Extension: 42 degrees with pain  Left lateral flexion: 42 degrees with pain  Right lateral flexion: 45 degrees with pain  Left rotation: 68 degrees   Right rotation: 68 degrees     Additional Active Range of Motion Details  Pain in upper thoracic spine with lateral flexion to left    Joint Play Hypomobile: C3, C4, C5, C6 and C7 Pain: C5, C6 and C7     Strength/Myotome Testing     Left Shoulder     Planes of Motion   Flexion: 4+   Abduction: 4+   External rotation at 0°: 4+   Internal rotation at 0°: 4+     Right Shoulder     Planes of Motion   Flexion: 4+   Abduction: 4+   External rotation at 0°: 4+   Internal rotation at 0°: 4+     Left Elbow   Flexion: 4+  Extension: 4+    Right Elbow   Flexion: 4+  Extension: 4+    Left Wrist/Hand   Wrist extension: 4+  Wrist flexion: 4+  Radial deviation: 4+  Ulnar deviation: 4+    Right Wrist/Hand   Wrist extension: 4+  Wrist flexion: 4+  Radial deviation: 4+  Ulnar deviation: 4+    Tests   Cervical   Negative repeated extension and repeated flexion  Left   Positive cervical distraction  Negative Spurling's sign and cervical compression test       Right   Positive cervical distraction  Negative Spurling's sign and cervical compression test       Left Shoulder   Negative ULTT1, ULTT3 and ULTT4  Right Shoulder   Negative ULTT1, ULTT3 and ULTT4             Precautions None    Specialty Daily Treatment Diary     Manual  9/10       Cervical txn JF       T3 Grade 5 mob JF       PA mobs C4-7        UPA mobs C4-7                    Exercise Diary  9/10       Postural ed JF       UBE stand 1/2 F/R        Webslide Rows H,M,L        Bkwd shoulder rolls        AROM c-spine        Bilateral UT stretch        Cervical retractions                                                                                                                    Modalities

## 2018-09-17 ENCOUNTER — OFFICE VISIT (OUTPATIENT)
Dept: PHYSICAL THERAPY | Facility: CLINIC | Age: 58
End: 2018-09-17
Payer: COMMERCIAL

## 2018-09-17 DIAGNOSIS — M54.2 NECK PAIN: Primary | ICD-10-CM

## 2018-09-17 PROCEDURE — 97112 NEUROMUSCULAR REEDUCATION: CPT | Performed by: PHYSICAL THERAPIST

## 2018-09-17 PROCEDURE — 97140 MANUAL THERAPY 1/> REGIONS: CPT | Performed by: PHYSICAL THERAPIST

## 2018-09-17 PROCEDURE — 97110 THERAPEUTIC EXERCISES: CPT | Performed by: PHYSICAL THERAPIST

## 2018-09-17 NOTE — PROGRESS NOTES
Daily Note     Today's date: 2018  Patient name: Kirk Berkowitz  : 1960  MRN: [id number]  Referring provider: Amna Slaughter MD  Dx:   Encounter Diagnosis     ICD-10-CM    1  Neck pain M54 2                   Subjective: Patient  Got good symptom relief for about a day after his last session  Has noticed that when he is aware of his posture, symptoms are less intense  Pain today is a 5/10 localized just to the right side of C7 spinous process  Objective: See treatment diary below  Precautions None     Specialty Daily Treatment Diary      Manual  9/10  9/14         Cervical txn JF  JF         T3 Grade 5 mob JF           PA mobs C4-7    JF         UPA mobs C4-7    JF                             Exercise Diary  9/10  9/14         Postural ed JF           UBE stand 1/2 F/R    L2x10'         Webslide Rows H,M,L    Blue x20         Bkwd shoulder rolls    x20         AROM c-spine    3"x20         Bilateral UT stretch    3x30"         Cervical retractions    x20                                                                                                                                                                                                     Modalities                                                             Assessment: Tolerated treatment well  Patient would benefit from continued PT  Patient pain-free after session  Felt much better with stretching and manual techniques  Provided with [facility]      Plan: Continue per plan of care

## 2018-09-19 ENCOUNTER — OFFICE VISIT (OUTPATIENT)
Dept: PHYSICAL THERAPY | Facility: CLINIC | Age: 58
End: 2018-09-19
Payer: COMMERCIAL

## 2018-09-19 DIAGNOSIS — M54.2 NECK PAIN: Primary | ICD-10-CM

## 2018-09-19 PROCEDURE — 97110 THERAPEUTIC EXERCISES: CPT | Performed by: PHYSICAL THERAPIST

## 2018-09-19 PROCEDURE — 97140 MANUAL THERAPY 1/> REGIONS: CPT | Performed by: PHYSICAL THERAPIST

## 2018-09-19 PROCEDURE — 97112 NEUROMUSCULAR REEDUCATION: CPT | Performed by: PHYSICAL THERAPIST

## 2018-09-19 NOTE — PROGRESS NOTES
Daily Note     Today's date: 2018  Patient name: Oleksandr Granados  : 1960  MRN: [id number]  Referring provider: Jessica Schofield MD  Dx:   Encounter Diagnosis     ICD-10-CM    1  Neck pain M54 2                   Subjective: Patient states he has been feeling better  Pain today is a 4 8/10  States when he starts getting stiff at work, he does his ex and he feels better  Objective: See treatment diary below  Precautions None     Specialty Daily Treatment Diary      Manual  9/10  9/14  9/18       Cervical txn JF  JF  JF       T3 Grade 5 mob JF           PA mobs C4-7    JF  JF       UPA mobs C4-7    JF  JF                           Exercise Diary  9/10  9/14  9/18       Postural ed JF           UBE stand 1/2 F/R    L2x10'  L2x10'       Webslide Rows H,M,L    Blue x20  BTB 2x10       Bkwd shoulder rolls    x20  x30       AROM c-spine    3"x20  3"x20       Bilateral UT stretch    3x30"  3x30"       Cervical retractions    x20  x20                                                                                                                                                                                                   Modalities                                                             Assessment: Tolerated treatment well  Patient would benefit from continued PT  Pain was decreased after stretches and TE and was abolished after manual techniques  Advised patient to perform all TE and stretches on a regular basis to control pain while at work and at home  Plan: Continue per plan of care

## 2018-09-25 ENCOUNTER — APPOINTMENT (OUTPATIENT)
Dept: PHYSICAL THERAPY | Facility: CLINIC | Age: 58
End: 2018-09-25
Payer: COMMERCIAL

## 2018-09-25 NOTE — PROGRESS NOTES
Daily Note     Today's date: 2018  Patient name: Larisa Graham  : 1960  MRN: [id number]  Referring provider: Ranulfo Nuñez MD  Dx:   Encounter Diagnosis     ICD-10-CM    1  Neck pain M54 2                   Subjective: ***      Objective: See treatment diary below  Precautions None     Specialty Daily Treatment Diary      Manual  9/10  9/14  9/18       Cervical txn JF  JF  JF       T3 Grade 5 mob JF           PA mobs C4-7    JF  JF       UPA mobs C4-7    JF  JF                           Exercise Diary  9/10  9/14  9/18       Postural ed JF           UBE stand / F/R    L2x10'  L2x10'       Webslide Rows H,M,L    Blue x20  BTB 2x10       Bkwd shoulder rolls    x20  x30       AROM c-spine    3"x20  3"x20       Bilateral UT stretch    3x30"  3x30"       Cervical retractions    x20  x20                                                                                                                                                                                                   Modalities                                                             Assessment: Tolerated treatment {Tolerated treatment :[id number]}   Patient {assessment:[id number]}      Plan: {PLAN:[id number]}

## 2018-09-27 ENCOUNTER — APPOINTMENT (OUTPATIENT)
Dept: PHYSICAL THERAPY | Facility: CLINIC | Age: 58
End: 2018-09-27
Payer: COMMERCIAL

## 2018-09-27 NOTE — PROGRESS NOTES
Daily Note     Today's date: 2018  Patient name: Natty Belle  : 1960  MRN: [id number]  Referring provider: Shannan Gallardo MD  Dx:   Encounter Diagnosis     ICD-10-CM    1  Neck pain M54 2                   Subjective: ***      Objective: See treatment diary below  Precautions None     Specialty Daily Treatment Diary      Manual  9/10  9/14  9/18       Cervical txn JF  JF  JF       T3 Grade 5 mob JF           PA mobs C4-7    JF  JF       UPA mobs C4-7    JF  JF                           Exercise Diary  9/10  9/14  9/18       Postural ed JF           UBE stand /2 F/R    L2x10'  L2x10'       Webslide Rows H,M,L    Blue x20  BTB 2x10       Bkwd shoulder rolls    x20  x30       AROM c-spine    3"x20  3"x20       Bilateral UT stretch    3x30"  3x30"       Cervical retractions    x20  x20                                                                                                                                                                                                   Modalities                                                             Assessment: Tolerated treatment {Tolerated treatment :[id number]}   Patient {assessment:[id number]}      Plan: {PLAN:[id number]}

## 2018-10-02 ENCOUNTER — APPOINTMENT (OUTPATIENT)
Dept: PHYSICAL THERAPY | Facility: CLINIC | Age: 58
End: 2018-10-02
Payer: COMMERCIAL

## 2018-10-04 ENCOUNTER — APPOINTMENT (OUTPATIENT)
Dept: PHYSICAL THERAPY | Facility: CLINIC | Age: 58
End: 2018-10-04
Payer: COMMERCIAL

## 2018-10-09 ENCOUNTER — EVALUATION (OUTPATIENT)
Dept: PHYSICAL THERAPY | Facility: CLINIC | Age: 58
End: 2018-10-09
Payer: COMMERCIAL

## 2018-10-09 DIAGNOSIS — M54.2 NECK PAIN: Primary | ICD-10-CM

## 2018-10-09 PROCEDURE — 97110 THERAPEUTIC EXERCISES: CPT | Performed by: PHYSICAL THERAPIST

## 2018-10-09 PROCEDURE — 97140 MANUAL THERAPY 1/> REGIONS: CPT | Performed by: PHYSICAL THERAPIST

## 2018-10-09 PROCEDURE — G8983 BODY POS D/C STATUS: HCPCS | Performed by: PHYSICAL THERAPIST

## 2018-10-09 PROCEDURE — G8982 BODY POS GOAL STATUS: HCPCS | Performed by: PHYSICAL THERAPIST

## 2018-10-09 NOTE — PROGRESS NOTES
PT Discharge    Today's date: 10/9/2018  Patient name: Yana Reyes  : 1960  MRN: [id number]  Referring provider: Salome Paulino MD  Dx:   Encounter Diagnosis     ICD-10-CM    1  Neck pain M54 2                   Assessment  Impairments: abnormal or restricted ROM, pain with function and poor posture   Functional limitations: Pain looking over shoulder while driving  Pain holding phone to his ear  Assessment details: Patient has made significant gains in cervical ROM since starting physical therapy  He denies pain or functional limitations  He is independent with a HEP  He has been very aware of his posture  He feels he has met all of his goals and requires no further skilled PT services at this time  Understanding of Dx/Px/POC: excellent  Goals  STG (2 weeks)  1  Patient will report pain decreased to a 2/10 at worst with working at the computer - met  2  Patient will demonstrate cervical ROM to [facility] in order to look over shoulder while driving - met  LTG (4 weeks)  1  Patient will report pain as a 0-1/10 at worst with looking over shoulder while driving - met  2  Patient will demonstrate improved postural awareness with sitting and standing to minimize strain on spine  - met    Plan  Planned therapy interventions: postural training and patient education  Plan of Care beginning date: 9/10/2018  Plan of Care expiration date: 10/8/2018  Treatment plan discussed with: patient        Subjective Evaluation    History of Present Illness  Date of onset: 7/10/2018  Mechanism of injury: Patient feels he is back to normal   Is not having any pain  He has noticed an improvement in cervical ROM and has no pain with looking  Over shoulder when driving  Quality of life: good    Pain  No pain reported  Current pain ratin  At best pain ratin  At worst pain ratin  Progression: resolved    Social Support    Employment status: working ( for [employer]    Computer work and phone calls )  Exercise history: Has not been exercising for 1 5 years  Diagnostic Tests  X-ray: abnormal (Multilevel spondylitic changes)    FCE comments: Denies functional limitations  No longer has pain when looking over shoulder or holding phone to his ear  No longer feels like neck is fatigued  Treatments  Previous treatment: medication        Objective     Palpation   Left   No palpable tenderness to the upper trapezius  Right   No palpable tenderness to the upper trapezius       Neurological Testing     Sensation   Cervical/Thoracic   Left   Intact: light touch    Right   Intact: light touch    Reflexes   Left   Biceps (C5/C6): normal (2+)  Brachioradialis (C6): normal (2+)  Triceps (C7): normal (2+)    Right   Biceps (C5/C6): normal (2+)  Brachioradialis (C6): normal (2+)  Triceps (C7): normal (2+)    Active Range of Motion   Cervical/Thoracic Spine   Cervical    Subcranial retraction: with pain   Flexion: 60 degrees   Extension: 48 degrees   Left lateral flexion: 48 degrees   Right lateral flexion: 48 degrees   Left rotation: 85 degrees   Right rotation: 90 degrees     Joint Play Joints within functional limits: C1, C2, C3, C4, C5, C6 and C7     Strength/Myotome Testing     Left Shoulder     Planes of Motion   Flexion: 4+   Abduction: 4+   External rotation at 0°: 4+   Internal rotation at 0°: 4+     Right Shoulder     Planes of Motion   Flexion: 4+   Abduction: 4+   External rotation at 0°: 4+   Internal rotation at 0°: 4+     Left Elbow   Flexion: 4+  Extension: 4+    Right Elbow   Flexion: 4+  Extension: 4+    Left Wrist/Hand   Wrist extension: 4+  Wrist flexion: 4+  Radial deviation: 4+  Ulnar deviation: 4+    Right Wrist/Hand   Wrist extension: 4+  Wrist flexion: 4+  Radial deviation: 4+  Ulnar deviation: 4+          Precautions None     Specialty Daily Treatment Diary      Manual  9/10  9/14  9/19  10/9     Cervical txn VENTURA WHITEHEAD       T3 Grade 5 mob JF           PA mobs C4-7    VENTURA WHITEHEAD       UPA mobs C4-7    VENTURA WHITEHEAD                           Exercise Diary  9/10  9/14  9/19  10/9     Postural ed JF           UBE stand 1/2 F/R    L2x10'  L2x10'       Webslide Rows H,M,L    Blue x20  BTB 2x10       Bkwd shoulder rolls    x20  x30       AROM c-spine    3"x20  3"x20       Bilateral UT stretch    3x30"  3x30"       Cervical retractions    x20  x20                                                                                                                                                                                                   Modalities

## 2018-10-11 ENCOUNTER — APPOINTMENT (OUTPATIENT)
Dept: PHYSICAL THERAPY | Facility: CLINIC | Age: 58
End: 2018-10-11
Payer: COMMERCIAL

## 2018-10-16 ENCOUNTER — APPOINTMENT (OUTPATIENT)
Dept: PHYSICAL THERAPY | Facility: CLINIC | Age: 58
End: 2018-10-16
Payer: COMMERCIAL

## 2018-10-18 ENCOUNTER — APPOINTMENT (OUTPATIENT)
Dept: PHYSICAL THERAPY | Facility: CLINIC | Age: 58
End: 2018-10-18
Payer: COMMERCIAL

## 2018-10-23 ENCOUNTER — APPOINTMENT (OUTPATIENT)
Dept: PHYSICAL THERAPY | Facility: CLINIC | Age: 58
End: 2018-10-23
Payer: COMMERCIAL

## 2018-10-25 ENCOUNTER — APPOINTMENT (OUTPATIENT)
Dept: PHYSICAL THERAPY | Facility: CLINIC | Age: 58
End: 2018-10-25
Payer: COMMERCIAL

## 2019-05-14 ENCOUNTER — OFFICE VISIT (OUTPATIENT)
Dept: INTERNAL MEDICINE CLINIC | Facility: CLINIC | Age: 59
End: 2019-05-14
Payer: COMMERCIAL

## 2019-05-14 VITALS
DIASTOLIC BLOOD PRESSURE: 88 MMHG | SYSTOLIC BLOOD PRESSURE: 122 MMHG | HEIGHT: 67 IN | TEMPERATURE: 98 F | HEART RATE: 65 BPM | BODY MASS INDEX: 35.66 KG/M2 | WEIGHT: 227.2 LBS | OXYGEN SATURATION: 97 %

## 2019-05-14 DIAGNOSIS — B30.9 VIRAL CONJUNCTIVITIS: ICD-10-CM

## 2019-05-14 DIAGNOSIS — R09.82 POST-NASAL DRIP: ICD-10-CM

## 2019-05-14 DIAGNOSIS — J06.9 VIRAL UPPER RESPIRATORY TRACT INFECTION: Primary | ICD-10-CM

## 2019-05-14 PROCEDURE — 99214 OFFICE O/P EST MOD 30 MIN: CPT | Performed by: PHYSICIAN ASSISTANT

## 2019-05-14 PROCEDURE — 3008F BODY MASS INDEX DOCD: CPT | Performed by: PHYSICIAN ASSISTANT

## 2019-05-14 RX ORDER — FLUTICASONE PROPIONATE 50 MCG
2 SPRAY, SUSPENSION (ML) NASAL DAILY
Qty: 16 G | Refills: 3 | Status: SHIPPED | OUTPATIENT
Start: 2019-05-14 | End: 2020-10-13 | Stop reason: CLARIF

## 2019-05-22 ENCOUNTER — TELEPHONE (OUTPATIENT)
Dept: INTERNAL MEDICINE CLINIC | Facility: CLINIC | Age: 59
End: 2019-05-22

## 2020-06-09 ENCOUNTER — TELEPHONE (OUTPATIENT)
Dept: DERMATOLOGY | Facility: CLINIC | Age: 60
End: 2020-06-09

## 2020-06-10 ENCOUNTER — OFFICE VISIT (OUTPATIENT)
Dept: DERMATOLOGY | Facility: CLINIC | Age: 60
End: 2020-06-10
Payer: COMMERCIAL

## 2020-06-10 DIAGNOSIS — L82.1 SEBORRHEIC KERATOSIS: ICD-10-CM

## 2020-06-10 DIAGNOSIS — L92.0 GRANULOMA ANNULARE: Primary | ICD-10-CM

## 2020-06-10 DIAGNOSIS — Z13.89 SCREENING FOR SKIN CONDITION: ICD-10-CM

## 2020-06-10 PROCEDURE — 1036F TOBACCO NON-USER: CPT | Performed by: DERMATOLOGY

## 2020-06-10 PROCEDURE — 99214 OFFICE O/P EST MOD 30 MIN: CPT | Performed by: DERMATOLOGY

## 2020-09-14 ENCOUNTER — TELEPHONE (OUTPATIENT)
Dept: INTERNAL MEDICINE CLINIC | Facility: CLINIC | Age: 60
End: 2020-09-14

## 2020-09-14 NOTE — TELEPHONE ENCOUNTER
Pt needs order for pneumonia shot  Scheduled for 10/05/20    Can pt have flu shot at the same time?   Please advise

## 2020-10-13 ENCOUNTER — OFFICE VISIT (OUTPATIENT)
Dept: INTERNAL MEDICINE CLINIC | Facility: CLINIC | Age: 60
End: 2020-10-13
Payer: COMMERCIAL

## 2020-10-13 VITALS
SYSTOLIC BLOOD PRESSURE: 126 MMHG | WEIGHT: 210.2 LBS | TEMPERATURE: 97.5 F | HEIGHT: 65 IN | HEART RATE: 48 BPM | BODY MASS INDEX: 35.02 KG/M2 | DIASTOLIC BLOOD PRESSURE: 72 MMHG | OXYGEN SATURATION: 97 %

## 2020-10-13 DIAGNOSIS — Z23 ENCOUNTER FOR IMMUNIZATION: ICD-10-CM

## 2020-10-13 DIAGNOSIS — Z12.11 SCREENING FOR COLORECTAL CANCER: ICD-10-CM

## 2020-10-13 DIAGNOSIS — Z12.12 SCREENING FOR COLORECTAL CANCER: ICD-10-CM

## 2020-10-13 DIAGNOSIS — Z00.00 ADULT GENERAL MEDICAL EXAMINATION: Primary | ICD-10-CM

## 2020-10-13 DIAGNOSIS — Z13.6 SCREENING FOR CARDIOVASCULAR CONDITION: ICD-10-CM

## 2020-10-13 DIAGNOSIS — Z12.5 SCREENING FOR PROSTATE CANCER: ICD-10-CM

## 2020-10-13 DIAGNOSIS — Z11.59 NEED FOR HEPATITIS C SCREENING TEST: ICD-10-CM

## 2020-10-13 PROBLEM — Z13.89 SCREENING FOR SKIN CONDITION: Status: RESOLVED | Noted: 2018-08-14 | Resolved: 2020-10-13

## 2020-10-13 PROBLEM — M54.2 CERVICALGIA: Status: RESOLVED | Noted: 2018-08-22 | Resolved: 2020-10-13

## 2020-10-13 PROCEDURE — 90471 IMMUNIZATION ADMIN: CPT | Performed by: INTERNAL MEDICINE

## 2020-10-13 PROCEDURE — 99396 PREV VISIT EST AGE 40-64: CPT | Performed by: INTERNAL MEDICINE

## 2020-10-13 PROCEDURE — 90682 RIV4 VACC RECOMBINANT DNA IM: CPT | Performed by: INTERNAL MEDICINE

## 2020-11-25 ENCOUNTER — TELEPHONE (OUTPATIENT)
Dept: INTERNAL MEDICINE CLINIC | Facility: CLINIC | Age: 60
End: 2020-11-25

## 2020-11-25 LAB
BASOPHILS # BLD AUTO: 0 X10E3/UL (ref 0–0.2)
BASOPHILS NFR BLD AUTO: 1 %
BUN SERPL-MCNC: 24 MG/DL (ref 8–27)
BUN/CREAT SERPL: 17 (ref 10–24)
CALCIUM SERPL-MCNC: 8.8 MG/DL (ref 8.6–10.2)
CHLORIDE SERPL-SCNC: 101 MMOL/L (ref 96–106)
CHOLEST SERPL-MCNC: 227 MG/DL (ref 100–199)
CO2 SERPL-SCNC: 22 MMOL/L (ref 20–29)
CREAT SERPL-MCNC: 1.39 MG/DL (ref 0.76–1.27)
EOSINOPHIL # BLD AUTO: 0.2 X10E3/UL (ref 0–0.4)
EOSINOPHIL NFR BLD AUTO: 4 %
ERYTHROCYTE [DISTWIDTH] IN BLOOD BY AUTOMATED COUNT: 12.8 % (ref 11.6–15.4)
GLUCOSE SERPL-MCNC: 91 MG/DL (ref 65–99)
HCT VFR BLD AUTO: 48.8 % (ref 37.5–51)
HCV AB S/CO SERPL IA: 0.1 S/CO RATIO (ref 0–0.9)
HDLC SERPL-MCNC: 37 MG/DL
HGB BLD-MCNC: 16.7 G/DL (ref 13–17.7)
IMM GRANULOCYTES # BLD: 0 X10E3/UL (ref 0–0.1)
IMM GRANULOCYTES NFR BLD: 1 %
LDLC SERPL CALC-MCNC: 174 MG/DL (ref 0–99)
LYMPHOCYTES # BLD AUTO: 1.7 X10E3/UL (ref 0.7–3.1)
LYMPHOCYTES NFR BLD AUTO: 30 %
MCH RBC QN AUTO: 31 PG (ref 26.6–33)
MCHC RBC AUTO-ENTMCNC: 34.2 G/DL (ref 31.5–35.7)
MCV RBC AUTO: 91 FL (ref 79–97)
MONOCYTES # BLD AUTO: 0.6 X10E3/UL (ref 0.1–0.9)
MONOCYTES NFR BLD AUTO: 10 %
NEUTROPHILS # BLD AUTO: 3.2 X10E3/UL (ref 1.4–7)
NEUTROPHILS NFR BLD AUTO: 54 %
PLATELET # BLD AUTO: 167 X10E3/UL (ref 150–450)
POTASSIUM SERPL-SCNC: 4.7 MMOL/L (ref 3.5–5.2)
PSA SERPL-MCNC: 0.6 NG/ML (ref 0–4)
RBC # BLD AUTO: 5.39 X10E6/UL (ref 4.14–5.8)
SL AMB EGFR AFRICAN AMERICAN: 63 ML/MIN/1.73
SL AMB EGFR NON AFRICAN AMERICAN: 54 ML/MIN/1.73
SL AMB INTERPRETATION: NORMAL
SL AMB VLDL CHOLESTEROL CALC: 16 MG/DL (ref 5–40)
SODIUM SERPL-SCNC: 137 MMOL/L (ref 134–144)
TRIGL SERPL-MCNC: 91 MG/DL (ref 0–149)
WBC # BLD AUTO: 5.7 X10E3/UL (ref 3.4–10.8)

## 2021-02-15 ENCOUNTER — TELEPHONE (OUTPATIENT)
Dept: GASTROENTEROLOGY | Facility: CLINIC | Age: 61
End: 2021-02-15

## 2021-05-17 ENCOUNTER — TELEPHONE (OUTPATIENT)
Dept: GASTROENTEROLOGY | Facility: CLINIC | Age: 61
End: 2021-05-17

## 2021-05-24 ENCOUNTER — ANESTHESIA (OUTPATIENT)
Dept: GASTROENTEROLOGY | Facility: HOSPITAL | Age: 61
End: 2021-05-24

## 2021-05-24 ENCOUNTER — ANESTHESIA EVENT (OUTPATIENT)
Dept: GASTROENTEROLOGY | Facility: HOSPITAL | Age: 61
End: 2021-05-24

## 2021-05-24 ENCOUNTER — HOSPITAL ENCOUNTER (OUTPATIENT)
Dept: GASTROENTEROLOGY | Facility: HOSPITAL | Age: 61
Setting detail: OUTPATIENT SURGERY
Discharge: HOME | End: 2021-05-24
Attending: INTERNAL MEDICINE
Payer: COMMERCIAL

## 2021-05-24 VITALS
HEART RATE: 48 BPM | RESPIRATION RATE: 16 BRPM | WEIGHT: 213.41 LBS | TEMPERATURE: 97.3 F | SYSTOLIC BLOOD PRESSURE: 118 MMHG | BODY MASS INDEX: 34.3 KG/M2 | DIASTOLIC BLOOD PRESSURE: 83 MMHG | HEIGHT: 66 IN | OXYGEN SATURATION: 97 %

## 2021-05-24 DIAGNOSIS — Z12.11 SCREENING FOR COLON CANCER: ICD-10-CM

## 2021-05-24 PROBLEM — N18.9 CHRONIC KIDNEY DISEASE: Status: ACTIVE | Noted: 2021-05-24

## 2021-05-24 PROCEDURE — 88305 TISSUE EXAM BY PATHOLOGIST: CPT | Performed by: PATHOLOGY

## 2021-05-24 PROCEDURE — 45385 COLONOSCOPY W/LESION REMOVAL: CPT | Performed by: INTERNAL MEDICINE

## 2021-05-24 RX ORDER — OMEGA-3-ACID ETHYL ESTERS 1 G/1
2 CAPSULE, LIQUID FILLED ORAL 2 TIMES DAILY
COMMUNITY

## 2021-05-24 RX ORDER — GLYCOPYRROLATE 0.2 MG/ML
INJECTION INTRAMUSCULAR; INTRAVENOUS AS NEEDED
Status: DISCONTINUED | OUTPATIENT
Start: 2021-05-24 | End: 2021-05-24

## 2021-05-24 RX ORDER — LIDOCAINE HYDROCHLORIDE 10 MG/ML
INJECTION, SOLUTION EPIDURAL; INFILTRATION; INTRACAUDAL; PERINEURAL AS NEEDED
Status: DISCONTINUED | OUTPATIENT
Start: 2021-05-24 | End: 2021-05-24

## 2021-05-24 RX ORDER — SODIUM CHLORIDE, SODIUM LACTATE, POTASSIUM CHLORIDE, CALCIUM CHLORIDE 600; 310; 30; 20 MG/100ML; MG/100ML; MG/100ML; MG/100ML
INJECTION, SOLUTION INTRAVENOUS CONTINUOUS PRN
Status: DISCONTINUED | OUTPATIENT
Start: 2021-05-24 | End: 2021-05-24

## 2021-05-24 RX ORDER — SODIUM CHLORIDE, SODIUM LACTATE, POTASSIUM CHLORIDE, CALCIUM CHLORIDE 600; 310; 30; 20 MG/100ML; MG/100ML; MG/100ML; MG/100ML
125 INJECTION, SOLUTION INTRAVENOUS CONTINUOUS
Status: CANCELLED | OUTPATIENT
Start: 2021-05-24

## 2021-05-24 RX ORDER — IBUPROFEN 400 MG/1
TABLET ORAL EVERY 6 HOURS PRN
COMMUNITY

## 2021-05-24 RX ORDER — PROPOFOL 10 MG/ML
INJECTION, EMULSION INTRAVENOUS AS NEEDED
Status: DISCONTINUED | OUTPATIENT
Start: 2021-05-24 | End: 2021-05-24

## 2021-05-24 RX ADMIN — LIDOCAINE HYDROCHLORIDE 50 MG: 10 INJECTION, SOLUTION EPIDURAL; INFILTRATION; INTRACAUDAL; PERINEURAL at 10:02

## 2021-05-24 RX ADMIN — PROPOFOL 150 MG: 10 INJECTION, EMULSION INTRAVENOUS at 10:02

## 2021-05-24 RX ADMIN — GLYCOPYRROLATE 0.2 MG: 0.2 INJECTION, SOLUTION INTRAMUSCULAR; INTRAVENOUS at 10:02

## 2021-05-24 RX ADMIN — PROPOFOL 30 MG: 10 INJECTION, EMULSION INTRAVENOUS at 10:07

## 2021-05-24 RX ADMIN — SODIUM CHLORIDE, SODIUM LACTATE, POTASSIUM CHLORIDE, AND CALCIUM CHLORIDE: .6; .31; .03; .02 INJECTION, SOLUTION INTRAVENOUS at 09:59

## 2021-05-24 RX ADMIN — PROPOFOL 50 MG: 10 INJECTION, EMULSION INTRAVENOUS at 10:05

## 2021-05-24 NOTE — ANESTHESIA POSTPROCEDURE EVALUATION
Post-Op Assessment Note    CV Status:  Stable  Pain Score: 0    Pain management: adequate     Mental Status:  Alert and awake   Hydration Status:  Euvolemic   PONV Controlled:  Controlled   Airway Patency:  Patent and adequate      Post Op Vitals Reviewed: Yes      Staff: CRNA         No complications documented      BP   118/73   Temp      Pulse 58   Resp 20   SpO2   97

## 2021-05-24 NOTE — H&P
History and Physical -  [facility]  Maya Hendrickson 57 y o  male MRN: [id number]                  HPI: Maya Hendrickson is a 57y o  year old male who presents for colonoscopy for colon cancer screening      REVIEW OF SYSTEMS: Per the HPI, and otherwise unremarkable  Historical Information   History reviewed  No pertinent past medical history  Past Surgical History:   Procedure Laterality Date    UMBILICAL HERNIA REPAIR      Last Assessed: 3/14/2016     Social History   Social History     Substance and Sexual Activity   Alcohol Use Yes    Frequency: Monthly or less    Comment: rarely     Social History     Substance and Sexual Activity   Drug Use No     Social History     Tobacco Use   Smoking Status Never Smoker   Smokeless Tobacco Never Used     Family History   Problem Relation Age of Onset    Other Mother         bacterial meningitis    Leukemia Sister         acute       Meds/Allergies     (Not in a hospital admission)      No Known Allergies    Objective     Blood pressure 128/76, pulse (!) 51, temperature 97 8 °F (36 6 °C), temperature source Temporal, resp  rate 14, height 5' 6" (1 676 m), weight 96 8 kg (213 lb 6 5 oz), SpO2 97 %  PHYSICAL EXAM    /76   Pulse (!) 51   Temp 97 8 °F (36 6 °C) (Temporal)   Resp 14   Ht 5' 6" (1 676 m)   Wt 96 8 kg (213 lb 6 5 oz)   SpO2 97%   BMI 34 44 kg/m²       Gen: NAD  CV: RRR  CHEST: Clear  ABD: soft, NT/ND  EXT: no edema      ASSESSMENT/PLAN:  This is a 57y o  year old male here for colonoscopy, and he is stable and optimized for his procedure

## 2021-05-24 NOTE — ANESTHESIA PREPROCEDURE EVALUATION
Procedure:  COLONOSCOPY    Relevant Problems   /RENAL   (+) Chronic kidney disease        Physical Exam    Airway    Mallampati score: II  TM Distance: >3 FB  Neck ROM: full     Dental       Cardiovascular  Cardiovascular exam normal    Pulmonary  Pulmonary exam normal     Other Findings       Screening for colorectal cancer           Anesthesia Plan  ASA Score- 2     Anesthesia Type- IV sedation with anesthesia with ASA Monitors  Additional Monitors:   Airway Plan:           Plan Factors-Exercise tolerance (METS): >4 METS  Chart reviewed  Existing labs reviewed  Patient summary reviewed  Induction- intravenous  Postoperative Plan-     Informed Consent- Anesthetic plan and risks discussed with patient  I personally reviewed this patient with the CRNA  Discussed and agreed on the Anesthesia Plan with the FOZIA Craft

## 2021-05-24 NOTE — DISCHARGE INSTRUCTIONS
Diverticulosis   AMBULATORY CARE:   Diverticulosis  is a condition that causes small pockets called diverticula to form in your intestine  These pockets make it difficult for bowel movements to pass through your digestive system  Signs and symptoms:  Diverticulosis usually does not cause any signs or symptoms  It may cause any of the following in some people:  · Pain or discomfort in your lower abdomen    · Abdominal bloating    · Constipation or diarrhea    Seek care immediately if:   · You have severe pain on the left side of your lower abdomen  · Your bowel movements are bright or dark red  Contact your healthcare provider if:   · You have a fever and chills  · You feel dizzy or lightheaded  · You have nausea, or you are vomiting  · You have a change in your bowel movements  · You have questions or concerns about your condition or care  Treatment:  The goal of treatment is to manage any symptoms you have and prevent other problems such as diverticulitis  Diverticulitis is swelling or infection of the diverticula  Your healthcare provider may recommend any of the following:  · Eat a variety of high-fiber foods  High-fiber foods help you have regular bowel movements  High-fiber foods include cooked beans, fruits, vegetables, and some cereals  Most adults need 25 to 35 grams of fiber each day  Your healthcare provider may recommend that you have more  Ask your healthcare provider how much fiber you need  Increase fiber slowly  You may have abdominal discomfort, bloating, and gas if you add fiber to your diet too quickly  You may need to take a fiber supplement if you are not getting enough fiber from food  · Medicines  to soften your bowel movements may be given  You may also need medicines to treat symptoms such as bloating and pain  · Drink liquids as directed  You may need to drink 2 to 3 liters (8 to 12 cups) of liquids every day   Ask your healthcare provider how much liquid to drink each day and which liquids are best for you  · Apply heat  on your abdomen for 20 to 30 minutes every 2 hours for as many days as directed  Heat helps decrease pain and muscle spasms  Help prevent diverticulitis or other symptoms: The following may help decrease your risk for diverticulitis or symptoms, such as bleeding  Talk to your provider about these or other things you can do to prevent problems that may occur with diverticulosis  · Exercise regularly  Ask your healthcare provider about the best exercise plan for you  Exercise can help you have regular bowel movements  Get 30 minutes of exercise on most days of the week  · Maintain a healthy weight  Ask your healthcare provider how much you should weigh  Ask him or her to help you create a weight loss plan if you are overweight  · Do not smoke  Nicotine and other chemicals in cigarettes increase your risk for diverticulitis  Ask your healthcare provider for information if you currently smoke and need help to quit  E-cigarettes or smokeless tobacco still contain nicotine  Talk to your healthcare provider before you use these products  · Ask your healthcare provider if it is safe to take NSAIDs  NSAIDs may increase your risk of diverticulitis  Follow up with your healthcare provider as directed:  Write down your questions so you remember to ask them during your visits  © Copyright [address] [place] [address] Drive Information is for End User's use only and may not be sold, redistributed or otherwise used for commercial purposes  All illustrations and images included in CareNotes® are the copyrighted property of [employer]  or [facility]   The above information is an  only  It is not intended as medical advice for individual conditions or treatments  Talk to your doctor, nurse or pharmacist before following any medical regimen to see if it is safe and effective for you        Colonoscopy   WHAT YOU NEED TO KNOW:   A colonoscopy is a procedure to examine the inside of your colon (intestine) with a scope  Polyps or tissue growths may have been removed during your colonoscopy  It is normal to feel bloated and to have some abdominal discomfort  You should be passing gas  If you have hemorrhoids or you had polyps removed, you may have a small amount of bleeding  DISCHARGE INSTRUCTIONS:   Call your doctor if:   · You have a large amount of bright red blood in your bowel movements  · Your abdomen is hard and firm and you have severe pain  · You have sudden trouble breathing  · You develop a rash or hives  · You have a fever within 24 hours of your procedure  · You have not had a bowel movement for 3 days after your procedure  · You have questions or concerns about your condition or care  After your colonoscopy:   · Do not lift, strain, or run  for 3 days  · Rest as much as possible  You have been given medicine to relax you  Do not  drive or make important decisions for at least 24 hours  Return to your normal activity as directed  · Relieve gas and discomfort from bloating  by lying on your left side with a heating pad on your abdomen  You may need to take short walks to help the gas move out  Eat small meals until bloating is relieved  If you had polyps removed: For 7 days after your procedure:  · Do not  take aspirin  · Do not  go on long car rides  Help prevent constipation:   · Eat a variety of healthy foods  Healthy foods include fruit, vegetables, whole-grain breads, low-fat dairy products, beans, lean meat, and fish  Ask if you need to be on a special diet  Your healthcare provider may recommend that you eat high-fiber foods such as cooked beans  Fiber helps you have regular bowel movements  · Drink liquids as directed  Adults should drink between 9 and 13 eight-ounce cups of liquid every day  Ask what amount is best for you   For most people, good liquids to drink are water, juice, and milk  · Exercise as directed  Talk to your healthcare provider about the best exercise plan for you  Exercise can help prevent constipation, decrease your blood pressure and improve your health  Follow up with your healthcare provider as directed:  Write down your questions so you remember to ask them during your visits  © Copyright [address] Information is for End User's use only and may not be sold, redistributed or otherwise used for commercial purposes  All illustrations and images included in CareNotes® are the copyrighted property of [employer]  or [facility]   The above information is an  only  It is not intended as medical advice for individual conditions or treatments  Talk to your doctor, nurse or pharmacist before following any medical regimen to see if it is safe and effective for you

## 2021-05-28 ENCOUNTER — TELEPHONE (OUTPATIENT)
Dept: GASTROENTEROLOGY | Facility: CLINIC | Age: 61
End: 2021-05-28

## 2021-05-28 NOTE — TELEPHONE ENCOUNTER
----- Message from Johnny Helms MD sent at 5/28/2021  7:59 AM EDT -----  Please tell him that the polyp was precancerous and to have a colonoscopy in 5 years

## 2021-09-21 ENCOUNTER — OFFICE VISIT (OUTPATIENT)
Dept: INTERNAL MEDICINE CLINIC | Facility: CLINIC | Age: 61
End: 2021-09-21
Payer: COMMERCIAL

## 2021-09-21 VITALS
WEIGHT: 211.8 LBS | OXYGEN SATURATION: 99 % | BODY MASS INDEX: 33.24 KG/M2 | SYSTOLIC BLOOD PRESSURE: 114 MMHG | TEMPERATURE: 99.1 F | DIASTOLIC BLOOD PRESSURE: 72 MMHG | HEART RATE: 47 BPM | HEIGHT: 67 IN

## 2021-09-21 DIAGNOSIS — E66.9 OBESITY (BMI 30-39.9): ICD-10-CM

## 2021-09-21 DIAGNOSIS — N18.31 STAGE 3A CHRONIC KIDNEY DISEASE (HCC): ICD-10-CM

## 2021-09-21 DIAGNOSIS — Z12.5 SCREENING FOR PROSTATE CANCER: ICD-10-CM

## 2021-09-21 DIAGNOSIS — E78.49 OTHER HYPERLIPIDEMIA: ICD-10-CM

## 2021-09-21 DIAGNOSIS — Z00.00 ADULT GENERAL MEDICAL EXAMINATION: Primary | ICD-10-CM

## 2021-09-21 PROBLEM — M16.0 OSTEOARTHRITIS OF BOTH HIPS: Chronic | Status: ACTIVE | Noted: 2021-09-21

## 2021-09-21 PROCEDURE — 99396 PREV VISIT EST AGE 40-64: CPT | Performed by: INTERNAL MEDICINE

## 2021-09-21 PROCEDURE — 3008F BODY MASS INDEX DOCD: CPT | Performed by: INTERNAL MEDICINE

## 2021-09-21 PROCEDURE — 1036F TOBACCO NON-USER: CPT | Performed by: INTERNAL MEDICINE

## 2021-09-21 PROCEDURE — 3725F SCREEN DEPRESSION PERFORMED: CPT | Performed by: INTERNAL MEDICINE

## 2021-09-21 RX ORDER — PROPRANOLOL/HYDROCHLOROTHIAZID 40 MG-25MG
TABLET ORAL
COMMUNITY

## 2021-09-21 NOTE — PATIENT INSTRUCTIONS

## 2021-09-21 NOTE — ASSESSMENT & PLAN NOTE
Last Cr around 1 3 with GFR under 60  Discussed staying hydrated and I would avoid NSAIDs as they could worsen kidney disease  Check updated lab work

## 2021-09-21 NOTE — PROGRESS NOTES
ADULT ANNUAL PHYSICAL   [address]    NAME: Doris   AGE: 57 y o  SEX: male  : 1960     DATE: 2021     Assessment and Plan:     Problem List Items Addressed This Visit        Genitourinary    Stage 3a chronic kidney disease ([address] )     Last Cr around 1 3 with GFR under 60  Discussed staying hydrated and I would avoid NSAIDs as they could worsen kidney disease  Check updated lab work  Relevant Orders    CBC    Basic metabolic panel      Other Visit Diagnoses     Adult general medical examination    -  Primary    Other hyperlipidemia        Relevant Orders    Lipid panel    Screening for prostate cancer        Relevant Orders    PSA, Total Screen    Obesity (BMI 30-39  9)            Immunizations and preventive care screenings were discussed with patient today  Appropriate education was printed on patient's after visit summary  Counseling:  Alcohol/drug use: discussed moderation in alcohol intake, the recommendations for healthy alcohol use, and avoidance of illicit drug use  Dental Health: discussed importance of regular tooth brushing, flossing, and dental visits  Injury prevention: discussed safety/seat belts, safety helmets, smoke detectors, carbon dioxide detectors, and smoking near bedding or upholstery  · Sexual health: discussed sexually transmitted diseases, partner selection, use of condoms, avoidance of unintended pregnancy, and contraceptive alternatives  BMI Counseling: Body mass index is 33 67 kg/m²  The BMI is above normal  Nutrition recommendations include decreasing portion sizes, encouraging healthy choices of fruits and vegetables, limiting drinks that contain sugar, moderation in carbohydrate intake and increasing intake of lean protein  Exercise recommendations include exercising 3-5 times per week  Rationale for BMI follow-up plan is due to patient being overweight or obese       Depression Screening and Follow-up Plan:   Patient was screened for depression during today's encounter  They screened negative with a PHQ-2 score of 0  Return in about 1 year (around 9/22/2022) for Annual Physical      Chief Complaint:     Chief Complaint   Patient presents with    Establish Care      History of Present Illness:     Adult Annual Physical   Patient here for a comprehensive physical exam  The patient reports problems with severe arthritis in his hips  He is controlling it by staying active and ibuprofen/tylenol prn  Follows with [facility] specialist in [facility]  He is fully vaccinated with moderna  Diet and Physical Activity  · Diet/Nutrition: tries to stay away from chocolate, alcohol, salt  · Exercise: remains active with tennis and golf     Depression Screening  PHQ-9 Depression Screening    PHQ-9:   Frequency of the following problems over the past two weeks:      Little interest or pleasure in doing things: 0 - not at all  Feeling down, depressed, or hopeless: 0 - not at all  PHQ-2 Score: 0       General Health  · Sleep: sleeps well  · Hearing: normal - bilateral   · Vision: goes for regular eye exams  · Dental: regular dental visits, brushes teeth once daily and flosses teeth occasionally  Review of Systems:     Review of Systems   Constitutional: Negative for activity change, appetite change and fatigue  HENT: Negative  Respiratory: Negative for apnea, cough, chest tightness, shortness of breath and wheezing  Cardiovascular: Negative for chest pain, palpitations and leg swelling  Gastrointestinal: Negative for abdominal distention, abdominal pain, blood in stool, constipation, diarrhea, nausea and vomiting  Musculoskeletal: Positive for arthralgias (bilateral hip pain)  Negative for back pain, gait problem, joint swelling and myalgias  Skin: Negative for rash and wound  Neurological: Negative for dizziness, weakness, light-headedness, numbness and headaches     Psychiatric/Behavioral: Negative for behavioral problems, confusion, hallucinations, sleep disturbance and suicidal ideas  The patient is not nervous/anxious  Past Medical History:     Past Medical History:   Diagnosis Date    Arthritis 07/2021    Bad arthritis in both hips       Past Surgical History:     Past Surgical History:   Procedure Laterality Date    UMBILICAL HERNIA REPAIR      Last Assessed: 3/14/2016      Family History:     Family History   Problem Relation Age of Onset    Other Mother         bacterial meningitis    Leukemia Sister         acute      Social History:     Social History     Socioeconomic History    Marital status: /Civil Union     Spouse name: None    Number of children: None    Years of education: None    Highest education level: None   Occupational History    Occupation: [email or web address]   Tobacco Use    Smoking status: Never Smoker    Smokeless tobacco: Never Used   Vaping Use    Vaping Use: Never used   Substance and Sexual Activity    Alcohol use: Yes     Comment: rarely    Drug use: No    Sexual activity: Yes     Partners: Female   Other Topics Concern    None   Social History Narrative    None     Social Determinants of Health     Financial Resource Strain:     Difficulty of Paying Living Expenses:    Food Insecurity:     Worried About Running Out of Food in the Last Year:     Ran Out of Food in the Last Year:    Transportation Needs:     Lack of Transportation (Medical):      Lack of Transportation (Non-Medical):    Physical Activity: Sufficiently Active    Days of Exercise per Week: 7 days    Minutes of Exercise per Session: 150+ min   Stress: No Stress Concern Present    Feeling of Stress : Not at all   Social Connections:     Frequency of Communication with Friends and Family:     Frequency of Social Gatherings with Friends and Family:     Attends Evangelical Services:     Active Member of Clubs or Organizations:     Attends Club or Organization Meetings:  Marital Status:    Intimate Partner Violence:     Fear of Current or Ex-Partner:     Emotionally Abused:     Physically Abused:     Sexually Abused:       Current Medications:     Current Outpatient Medications   Medication Sig Dispense Refill    ibuprofen (MOTRIN) 400 mg tablet Take by mouth every 6 (six) hours as needed for mild pain      omega-3-acid ethyl esters (LOVAZA) 1 g capsule Take 2 g by mouth 2 (two) times a day      Turmeric (Curcumin 95) 500 MG CAPS Take by mouth       No current facility-administered medications for this visit  Allergies:     No Known Allergies      Physical Exam:     /72 (BP Location: Left arm, Patient Position: Sitting, Cuff Size: Standard)   Pulse (!) 47   Temp 99 1 °F (37 3 °C) (Temporal)   Ht 5' 6 5" (1 689 m)   Wt 96 1 kg (211 lb 12 8 oz)   SpO2 99%   BMI 33 67 kg/m²     Physical Exam  Vitals reviewed  Constitutional:       General: He is not in acute distress  Appearance: He is well-developed  He is obese  He is not diaphoretic  HENT:      Right Ear: Tympanic membrane, ear canal and external ear normal  There is no impacted cerumen  Left Ear: Tympanic membrane, ear canal and external ear normal  There is no impacted cerumen  Eyes:      General: No scleral icterus  Right eye: No discharge  Left eye: No discharge  Conjunctiva/sclera: Conjunctivae normal    Neck:      Thyroid: No thyromegaly  Vascular: No JVD  Cardiovascular:      Rate and Rhythm: Normal rate and regular rhythm  Heart sounds: Normal heart sounds  No murmur heard  Pulmonary:      Effort: Pulmonary effort is normal  No respiratory distress  Breath sounds: Normal breath sounds  No wheezing or rales  Abdominal:      General: Bowel sounds are normal  There is no distension  Palpations: Abdomen is soft  Tenderness: There is no abdominal tenderness  Musculoskeletal:      Cervical back: Neck supple        Right lower leg: No edema  Left lower leg: No edema  Lymphadenopathy:      Cervical: No cervical adenopathy  Skin:     General: Skin is warm and dry  Neurological:      Mental Status: He is alert  Mental status is at baseline     Psychiatric:         Mood and Affect: Mood normal          Behavior: Behavior normal         Albino Espinosa,   MEDICAL [id number]

## 2021-11-16 LAB
BASOPHILS # BLD AUTO: 0.1 X10E3/UL (ref 0–0.2)
BASOPHILS NFR BLD AUTO: 1 %
BUN SERPL-MCNC: 23 MG/DL (ref 8–27)
BUN/CREAT SERPL: 15 (ref 10–24)
CALCIUM SERPL-MCNC: 9.2 MG/DL (ref 8.6–10.2)
CHLORIDE SERPL-SCNC: 103 MMOL/L (ref 96–106)
CHOLEST SERPL-MCNC: 234 MG/DL (ref 100–199)
CO2 SERPL-SCNC: 24 MMOL/L (ref 20–29)
CREAT SERPL-MCNC: 1.49 MG/DL (ref 0.76–1.27)
EOSINOPHIL # BLD AUTO: 0.2 X10E3/UL (ref 0–0.4)
EOSINOPHIL NFR BLD AUTO: 3 %
ERYTHROCYTE [DISTWIDTH] IN BLOOD BY AUTOMATED COUNT: 12.6 % (ref 11.6–15.4)
GLUCOSE SERPL-MCNC: 85 MG/DL (ref 65–99)
HCT VFR BLD AUTO: 50.5 % (ref 37.5–51)
HDLC SERPL-MCNC: 39 MG/DL
HGB BLD-MCNC: 17 G/DL (ref 13–17.7)
IMM GRANULOCYTES # BLD: 0 X10E3/UL (ref 0–0.1)
IMM GRANULOCYTES NFR BLD: 0 %
LDLC SERPL CALC-MCNC: 179 MG/DL (ref 0–99)
LYMPHOCYTES # BLD AUTO: 2.5 X10E3/UL (ref 0.7–3.1)
LYMPHOCYTES NFR BLD AUTO: 36 %
MCH RBC QN AUTO: 30.9 PG (ref 26.6–33)
MCHC RBC AUTO-ENTMCNC: 33.7 G/DL (ref 31.5–35.7)
MCV RBC AUTO: 92 FL (ref 79–97)
MONOCYTES # BLD AUTO: 0.7 X10E3/UL (ref 0.1–0.9)
MONOCYTES NFR BLD AUTO: 10 %
NEUTROPHILS # BLD AUTO: 3.5 X10E3/UL (ref 1.4–7)
NEUTROPHILS NFR BLD AUTO: 50 %
PLATELET # BLD AUTO: 174 X10E3/UL (ref 150–450)
POTASSIUM SERPL-SCNC: 5 MMOL/L (ref 3.5–5.2)
PSA SERPL-MCNC: 0.7 NG/ML (ref 0–4)
RBC # BLD AUTO: 5.5 X10E6/UL (ref 4.14–5.8)
SL AMB EGFR AFRICAN AMERICAN: 57 ML/MIN/1.73
SL AMB EGFR NON AFRICAN AMERICAN: 50 ML/MIN/1.73
SL AMB VLDL CHOLESTEROL CALC: 16 MG/DL (ref 5–40)
SODIUM SERPL-SCNC: 138 MMOL/L (ref 134–144)
TRIGL SERPL-MCNC: 90 MG/DL (ref 0–149)
WBC # BLD AUTO: 6.9 X10E3/UL (ref 3.4–10.8)

## 2021-11-17 ENCOUNTER — TELEPHONE (OUTPATIENT)
Dept: INTERNAL MEDICINE CLINIC | Facility: CLINIC | Age: 61
End: 2021-11-17

## 2021-11-17 NOTE — TELEPHONE ENCOUNTER
----- Message from Soraya Richardson DO sent at 11/17/2021  3:26 PM EST -----  Call patient and let him know labs are stable except cholesterol is higher than before   I would recommend cholesterol lowering medication to improve his cholesterol numbers which will decrease his chance of heart attack or stroke

## 2021-11-18 ENCOUNTER — TELEPHONE (OUTPATIENT)
Dept: INTERNAL MEDICINE CLINIC | Facility: CLINIC | Age: 61
End: 2021-11-18

## 2021-11-18 NOTE — TELEPHONE ENCOUNTER
----- Message from Betty Ren, DO sent at 11/17/2021  3:26 PM EST -----  Call patient and let him know labs are stable except cholesterol is higher than before   I would recommend cholesterol lowering medication to improve his cholesterol numbers which will decrease his chance of heart attack or stroke

## 2022-09-22 ENCOUNTER — OFFICE VISIT (OUTPATIENT)
Dept: INTERNAL MEDICINE CLINIC | Facility: CLINIC | Age: 62
End: 2022-09-22
Payer: COMMERCIAL

## 2022-09-22 VITALS
WEIGHT: 221 LBS | TEMPERATURE: 96.6 F | HEART RATE: 56 BPM | SYSTOLIC BLOOD PRESSURE: 122 MMHG | DIASTOLIC BLOOD PRESSURE: 74 MMHG | OXYGEN SATURATION: 95 % | BODY MASS INDEX: 36.82 KG/M2 | HEIGHT: 65 IN | RESPIRATION RATE: 20 BRPM

## 2022-09-22 DIAGNOSIS — Z12.5 SCREENING FOR PROSTATE CANCER: ICD-10-CM

## 2022-09-22 DIAGNOSIS — E66.01 SEVERE OBESITY (BMI 35.0-39.9) WITH COMORBIDITY (HCC): ICD-10-CM

## 2022-09-22 DIAGNOSIS — Z00.00 ADULT GENERAL MEDICAL EXAMINATION: Primary | ICD-10-CM

## 2022-09-22 DIAGNOSIS — N18.31 STAGE 3A CHRONIC KIDNEY DISEASE (HCC): ICD-10-CM

## 2022-09-22 DIAGNOSIS — E78.2 MIXED HYPERLIPIDEMIA: ICD-10-CM

## 2022-09-22 PROCEDURE — 99396 PREV VISIT EST AGE 40-64: CPT | Performed by: INTERNAL MEDICINE

## 2022-09-22 PROCEDURE — 3725F SCREEN DEPRESSION PERFORMED: CPT | Performed by: INTERNAL MEDICINE

## 2022-09-22 NOTE — PROGRESS NOTES
ADULT ANNUAL PHYSICAL   [address]    NAME: Shilpi Rodriguez  AGE: 60 y o  SEX: male  : 1960     DATE: 2022     Assessment and Plan:     1  Adult general medical examination    2  Stage 3a chronic kidney disease (HCC)    Component      Latest Ref Rng & Units 2020   Glucose, Random      65 - 99 mg/dL 91 85   BUN      8 - 27 mg/dL 24 23   Creatinine      0 76 - 1 27 mg/dL 1 39 (H) 1 49 (H)   eGFR Non       >59 mL/min/1 73 54 (L) 50 (L)   eGFR       >59 mL/min/1 73 63 57 (L)     Baseline Cr based off previous labs is 1 3-1 4  Discussed CKD with patient and wife  Would avoid NSAIDs  Can use topical voltaren  Stay well hydrated  Blood pressure is controlled  Weight loss advised  - Vitamin D 25 hydroxy; Future  - PTH, intact; Future  - CBC; Future  - Comprehensive metabolic panel; Future    3  Mixed hyperlipidemia    The 10-year ASCVD risk score (Lamont Gallegos et al , 2013) is: 13 3%    Values used to calculate the score:      Age: 60 years      Sex: Male      Is Non- : No      Diabetic: No      Tobacco smoker: No      Systolic Blood Pressure: 279 mmHg      Is BP treated: No      HDL Cholesterol: 39 mg/dL      Total Cholesterol: 234 mg/dL     Check updated lipid panel  May discuss statin therapy  - Lipid Panel with Direct LDL reflex; Future    4  Screening for prostate cancer    Can get done in the future since not due just yet  - PSA, Total Screen; Future    5  Severe obesity (BMI 35 0-39  9) with comorbidity ([address] )    Immunizations and preventive care screenings were discussed with patient today  Appropriate education was printed on patient's after visit summary  Counseling:  Alcohol/drug use: discussed moderation in alcohol intake, the recommendations for healthy alcohol use, and avoidance of illicit drug use    Dental Health: discussed importance of regular tooth brushing, flossing, and dental visits  Injury prevention: discussed safety/seat belts, safety helmets, smoke detectors, carbon dioxide detectors, and smoking near bedding or upholstery  · Sexual health: discussed sexually transmitted diseases, partner selection, use of condoms, avoidance of unintended pregnancy, and contraceptive alternatives  BMI Counseling: Body mass index is 36 78 kg/m²  The BMI is above normal  Nutrition recommendations include encouraging healthy choices of fruits and vegetables, limiting drinks that contain sugar, moderation in carbohydrate intake and increasing intake of lean protein  Exercise recommendations include exercising 3-5 times per week  Rationale for BMI follow-up plan is due to patient being overweight or obese  Depression Screening and Follow-up Plan: Patient was screened for depression during today's encounter  They screened negative with a PHQ-2 score of 0  Return in about 1 year (around 9/23/2023) for Annual Physical      Chief Complaint:     Chief Complaint   Patient presents with    Annual Exam    Knee Pain     Bilateral/ one  years     Hip Pain     Bilateral two years     Fatigue      History of Present Illness:     Adult Annual Physical   Patient here for a comprehensive physical exam  The patient reports having more knee and hip pain since he was last seen  He saw orthopedics he did talk about joint replacement  He has been holding off and went to physical therapy  He is an avid golfer  He is retired and spends 6 months down in [place]  Cholesterol was high last year and also has history of CKD3  Depression Screening  PHQ-2/9 Depression Screening    Little interest or pleasure in doing things: 0 - not at all  Feeling down, depressed, or hopeless: 0 - not at all  PHQ-2 Score: 0  PHQ-2 Interpretation: Negative depression screen       General Health  · Sleep: sleeps well     · Hearing: decreased - bilateral and tinnitus  · Vision: goes for regular eye exams    · Dental: no dental visits for >1 year and brushes teeth once daily  Review of Systems:     Review of Systems   Constitutional: Negative for appetite change, chills, fatigue and fever  HENT: Negative for congestion, hearing loss, postnasal drip, rhinorrhea, sore throat, tinnitus and trouble swallowing  Eyes: Negative for pain, discharge, redness and visual disturbance  Respiratory: Negative for cough, chest tightness, shortness of breath and wheezing  Cardiovascular: Negative for chest pain, palpitations and leg swelling  Gastrointestinal: Negative for abdominal distention, abdominal pain, blood in stool, constipation, diarrhea, nausea and vomiting  Endocrine: Negative for cold intolerance, heat intolerance, polydipsia, polyphagia and polyuria  Genitourinary: Negative for difficulty urinating, dysuria, frequency, hematuria and urgency  Musculoskeletal: Positive for arthralgias  Negative for back pain, gait problem, joint swelling, myalgias, neck pain and neck stiffness  Skin: Negative for color change and rash  Neurological: Negative for dizziness, tremors, seizures, syncope, speech difficulty, weakness, light-headedness, numbness and headaches  Hematological: Negative for adenopathy  Does not bruise/bleed easily  Psychiatric/Behavioral: Negative for agitation, behavioral problems, confusion, hallucinations, sleep disturbance and suicidal ideas  The patient is not nervous/anxious         Past Medical History:     Past Medical History:   Diagnosis Date    Arthritis 07/2021    Bad arthritis in both hips       Past Surgical History:     Past Surgical History:   Procedure Laterality Date    UMBILICAL HERNIA REPAIR      Last Assessed: 3/14/2016      Family History:     Family History   Problem Relation Age of Onset    Other Mother         bacterial meningitis    Leukemia Sister         acute      Social History:     Social History     Socioeconomic History    Marital status: /Civil [employer]     Spouse name: None    Number of children: None    Years of education: None    Highest education level: None   Occupational History    Occupation: Organizes trade shows   Tobacco Use    Smoking status: Never Smoker    Smokeless tobacco: Never Used   Vaping Use    Vaping Use: Never used   Substance and Sexual Activity    Alcohol use: Yes     Comment: rarely    Drug use: No    Sexual activity: Yes     Partners: Female   Other Topics Concern    None   Social History Narrative    None     Social Determinants of Health     Financial Resource Strain: Not on file   Food Insecurity: Not on file   Transportation Needs: Not on file   Physical Activity: Not on file   Stress: Not on file   Social Connections: Not on file   Intimate Partner Violence: Not on file   Housing Stability: Not on file      Current Medications:     Current Outpatient Medications   Medication Sig Dispense Refill    ibuprofen (MOTRIN) 400 mg tablet Take by mouth every 6 (six) hours as needed for mild pain      omega-3-acid ethyl esters (LOVAZA) 1 g capsule Take 2 g by mouth 2 (two) times a day      Turmeric 500 MG CAPS Take by mouth       No current facility-administered medications for this visit  Allergies:     No Known Allergies   Physical Exam:     /74 (BP Location: Left arm, Patient Position: Sitting, Cuff Size: Standard)   Pulse 56   Temp (!) 96 6 °F (35 9 °C) (Tympanic)   Resp 20   Ht 5' 5" (1 651 m)   Wt 100 kg (221 lb)   SpO2 95%   BMI 36 78 kg/m²     Physical Exam  Vitals reviewed  Constitutional:       General: He is not in acute distress  Appearance: He is well-developed  He is obese  He is not diaphoretic  HENT:      Right Ear: Tympanic membrane, ear canal and external ear normal  There is no impacted cerumen  Left Ear: Tympanic membrane, ear canal and external ear normal  There is no impacted cerumen  Eyes:      General: No scleral icterus  Right eye: No discharge  Left eye: No discharge  Conjunctiva/sclera: Conjunctivae normal    Neck:      Thyroid: No thyromegaly  Vascular: No JVD  Cardiovascular:      Rate and Rhythm: Normal rate and regular rhythm  Heart sounds: Normal heart sounds  No murmur heard  Pulmonary:      Effort: Pulmonary effort is normal  No respiratory distress  Breath sounds: Normal breath sounds  No wheezing or rales  Abdominal:      General: Bowel sounds are normal  There is no distension  Palpations: Abdomen is soft  Tenderness: There is no abdominal tenderness  Musculoskeletal:      Cervical back: Neck supple  Right lower leg: No edema  Left lower leg: No edema  Lymphadenopathy:      Cervical: No cervical adenopathy  Skin:     General: Skin is warm and dry  Neurological:      Mental Status: He is alert     Psychiatric:         Mood and Affect: Mood normal          Behavior: Behavior normal         Jose Mccallum [facility] [address]

## 2022-09-22 NOTE — PATIENT INSTRUCTIONS
Wellness Visit for Adults   AMBULATORY CARE:   A wellness visit  is when you see your healthcare provider to get screened for health problems  Your healthcare provider will also give you advice on how to stay healthy  Write down your questions so you remember to ask them  Ask your healthcare provider how often you should have a wellness visit  What happens at a wellness visit:  Your healthcare provider will ask about your health, and your family history of health problems  This includes high blood pressure, heart disease, and cancer  He or she will ask if you have symptoms that concern you, if you smoke, and about your mood  You may also be asked about your intake of medicines, supplements, food, and alcohol  Any of the following may be done: Your weight  will be checked  Your height may also be checked so your body mass index (BMI) can be calculated  Your BMI shows if you are at a healthy weight  Your blood pressure  and heart rate will be checked  Your temperature may also be checked  Blood and urine tests  may be done  Blood tests may be done to check your cholesterol levels  Abnormal cholesterol levels increase your risk for heart disease and stroke  You may also need a blood or urine test to check for diabetes if you are at increased risk  Urine tests may be done to look for signs of an infection or kidney disease  A physical exam  includes checking your heartbeat and lungs with a stethoscope  Your healthcare provider may also check your skin to look for sun damage  Screening tests  may be recommended  A screening test is done to check for diseases that may not cause symptoms  The screening tests you may need depend on your age, gender, family history, and lifestyle habits  For example, colorectal screening may be recommended if you are 48years old or older  Screening tests you need if you are a woman:   A Pap smear  is used to screen for cervical cancer   Pap smears are usually done every 3 to 5 years depending on your age  You may need them more often if you have had abnormal Pap smear test results in the past  Ask your healthcare provider how often you should have a Pap smear  A mammogram  is an x-ray of your breasts to screen for breast cancer  Experts recommend mammograms every 2 years starting at age 48 years  You may need a mammogram at age 52 years or younger if you have an increased risk for breast cancer  Talk to your healthcare provider about when you should start having mammograms and how often you need them  Vaccines you may need:   Get an influenza vaccine  every year  The influenza vaccine protects you from the flu  Several types of viruses cause the flu  The viruses change over time, so new vaccines are made each year  Get a tetanus-diphtheria (Td) booster vaccine  every 10 years  This vaccine protects you against tetanus and diphtheria  Tetanus is a severe infection that may cause painful muscle spasms and lockjaw  Diphtheria is a severe bacterial infection that causes a thick covering in the back of your mouth and throat  Get a human papillomavirus (HPV) vaccine  if you are female and aged 23 to 32 or male 23 to 24 and never received it  This vaccine protects you from HPV infection  HPV is the most common infection spread by sexual contact  HPV may also cause vaginal, penile, and anal cancers  Get a pneumococcal vaccine  if you are aged 72 years or older  The pneumococcal vaccine is an injection given to protect you from pneumococcal disease  Pneumococcal disease is an infection caused by pneumococcal bacteria  The infection may cause pneumonia, meningitis, or an ear infection  Get a shingles vaccine  if you are 60 or older, even if you have had shingles before  The shingles vaccine is an injection to protect you from the varicella-zoster virus  This is the same virus that causes chickenpox   Shingles is a painful rash that develops in people who had chickenpox or have been exposed to the virus  How to eat healthy:  My Plate is a model for planning healthy meals  It shows the types and amounts of foods that should go on your plate  Fruits and vegetables make up about half of your plate, and grains and protein make up the other half  A serving of dairy is included on the side of your plate  The amount of calories and serving sizes you need depends on your age, gender, weight, and height  Examples of healthy foods are listed below:  Eat a variety of vegetables  such as dark green, red, and orange vegetables  You can also include canned vegetables low in sodium (salt) and frozen vegetables without added butter or sauces  Eat a variety of fresh fruits , canned fruit in 100% juice, frozen fruit, and dried fruit  Include whole grains  At least half of the grains you eat should be whole grains  Examples include whole-wheat bread, wheat pasta, brown rice, and whole-grain cereals such as oatmeal     Eat a variety of protein foods such as seafood (fish and shellfish), lean meat, and poultry without skin (turkey and chicken)  Examples of lean meats include pork leg, shoulder, or tenderloin, and beef round, sirloin, tenderloin, and extra lean ground beef  Other protein foods include eggs and egg substitutes, beans, peas, soy products, nuts, and seeds  Choose low-fat dairy products such as skim or 1% milk or low-fat yogurt, cheese, and cottage cheese  Limit unhealthy fats  such as butter, hard margarine, and shortening  Exercise:  Exercise at least 30 minutes per day on most days of the week  Some examples of exercise include walking, biking, dancing, and swimming  You can also fit in more physical activity by taking the stairs instead of the elevator or parking farther away from stores  Include muscle strengthening activities 2 days each week  Regular exercise provides many health benefits   It helps you manage your weight, and decreases your risk for type 2 diabetes, heart disease, stroke, and high blood pressure  Exercise can also help improve your mood  Ask your healthcare provider about the best exercise plan for you  General health and safety guidelines:   Do not smoke  Nicotine and other chemicals in cigarettes and cigars can cause lung damage  Ask your healthcare provider for information if you currently smoke and need help to quit  E-cigarettes or smokeless tobacco still contain nicotine  Talk to your healthcare provider before you use these products  Limit alcohol  A drink of alcohol is 12 ounces of beer, 5 ounces of wine, or 1½ ounces of liquor  Lose weight, if needed  Being overweight increases your risk of certain health conditions  These include heart disease, high blood pressure, type 2 diabetes, and certain types of cancer  Protect your skin  Do not sunbathe or use tanning beds  Use sunscreen with a SPF 15 or higher  Apply sunscreen at least 15 minutes before you go outside  Reapply sunscreen every 2 hours  Wear protective clothing, hats, and sunglasses when you are outside  Drive safely  Always wear your seatbelt  Make sure everyone in your car wears a seatbelt  A seatbelt can save your life if you are in an accident  Do not use your cell phone when you are driving  This could distract you and cause an accident  Pull over if you need to make a call or send a text message  Practice safe sex  Use latex condoms if are sexually active and have more than one partner  Your healthcare provider may recommend screening tests for sexually transmitted infections (STIs)  Wear helmets, lifejackets, and protective gear  Always wear a helmet when you ride a bike or motorcycle, go skiing, or play sports that could cause a head injury  Wear protective equipment when you play sports  Wear a lifejacket when you are on a boat or doing water sports      © Copyright [employer] 2022 Information is for End User's use only and may not be sold, redistributed or otherwise used for commercial purposes  All illustrations and images included in CareNotes® are the copyrighted property of A D A M , Inc  or Naiza Emery  The above information is an  only  It is not intended as medical advice for individual conditions or treatments  Talk to your doctor, nurse or pharmacist before following any medical regimen to see if it is safe and effective for you

## 2022-10-04 LAB
25(OH)D3+25(OH)D2 SERPL-MCNC: 39.7 NG/ML (ref 30–100)
ALBUMIN SERPL-MCNC: 4.5 G/DL (ref 3.8–4.8)
ALBUMIN/GLOB SERPL: 1.5 {RATIO} (ref 1.2–2.2)
ALP SERPL-CCNC: 77 IU/L (ref 44–121)
ALT SERPL-CCNC: 22 IU/L (ref 0–44)
AST SERPL-CCNC: 22 IU/L (ref 0–40)
BASOPHILS # BLD AUTO: 0 X10E3/UL (ref 0–0.2)
BASOPHILS NFR BLD AUTO: 1 %
BILIRUB SERPL-MCNC: 0.7 MG/DL (ref 0–1.2)
BUN SERPL-MCNC: 29 MG/DL (ref 8–27)
BUN/CREAT SERPL: 18 (ref 10–24)
CALCIUM SERPL-MCNC: 9.3 MG/DL (ref 8.6–10.2)
CHLORIDE SERPL-SCNC: 103 MMOL/L (ref 96–106)
CHOLEST SERPL-MCNC: 269 MG/DL (ref 100–199)
CO2 SERPL-SCNC: 19 MMOL/L (ref 20–29)
CREAT SERPL-MCNC: 1.57 MG/DL (ref 0.76–1.27)
EGFR: 49 ML/MIN/1.73
EOSINOPHIL # BLD AUTO: 0.2 X10E3/UL (ref 0–0.4)
EOSINOPHIL NFR BLD AUTO: 3 %
ERYTHROCYTE [DISTWIDTH] IN BLOOD BY AUTOMATED COUNT: 13.1 % (ref 11.6–15.4)
GLOBULIN SER-MCNC: 3 G/DL (ref 1.5–4.5)
GLUCOSE SERPL-MCNC: 92 MG/DL (ref 70–99)
HCT VFR BLD AUTO: 49.7 % (ref 37.5–51)
HDLC SERPL-MCNC: 36 MG/DL
HGB BLD-MCNC: 16.7 G/DL (ref 13–17.7)
IMM GRANULOCYTES # BLD: 0 X10E3/UL (ref 0–0.1)
IMM GRANULOCYTES NFR BLD: 0 %
LDLC SERPL CALC-MCNC: 216 MG/DL (ref 0–99)
LYMPHOCYTES # BLD AUTO: 1.7 X10E3/UL (ref 0.7–3.1)
LYMPHOCYTES NFR BLD AUTO: 29 %
MCH RBC QN AUTO: 30.9 PG (ref 26.6–33)
MCHC RBC AUTO-ENTMCNC: 33.6 G/DL (ref 31.5–35.7)
MCV RBC AUTO: 92 FL (ref 79–97)
MONOCYTES # BLD AUTO: 0.6 X10E3/UL (ref 0.1–0.9)
MONOCYTES NFR BLD AUTO: 10 %
NEUTROPHILS # BLD AUTO: 3.4 X10E3/UL (ref 1.4–7)
NEUTROPHILS NFR BLD AUTO: 57 %
PLATELET # BLD AUTO: 190 X10E3/UL (ref 150–450)
POTASSIUM SERPL-SCNC: 4.9 MMOL/L (ref 3.5–5.2)
PROT SERPL-MCNC: 7.5 G/DL (ref 6–8.5)
RBC # BLD AUTO: 5.41 X10E6/UL (ref 4.14–5.8)
SODIUM SERPL-SCNC: 139 MMOL/L (ref 134–144)
TRIGL SERPL-MCNC: 98 MG/DL (ref 0–149)
WBC # BLD AUTO: 5.9 X10E3/UL (ref 3.4–10.8)

## 2022-10-05 ENCOUNTER — OFFICE VISIT (OUTPATIENT)
Dept: URGENT CARE | Facility: CLINIC | Age: 62
End: 2022-10-05
Payer: COMMERCIAL

## 2022-10-05 ENCOUNTER — TELEPHONE (OUTPATIENT)
Dept: INTERNAL MEDICINE CLINIC | Facility: CLINIC | Age: 62
End: 2022-10-05

## 2022-10-05 VITALS
RESPIRATION RATE: 18 BRPM | TEMPERATURE: 97.8 F | OXYGEN SATURATION: 96 % | HEIGHT: 65 IN | WEIGHT: 220 LBS | HEART RATE: 51 BPM | BODY MASS INDEX: 36.65 KG/M2

## 2022-10-05 DIAGNOSIS — L03.011 PARONYCHIA OF RIGHT RING FINGER: Primary | ICD-10-CM

## 2022-10-05 DIAGNOSIS — E78.2 MIXED HYPERLIPIDEMIA: Primary | ICD-10-CM

## 2022-10-05 PROCEDURE — 99203 OFFICE O/P NEW LOW 30 MIN: CPT | Performed by: PHYSICIAN ASSISTANT

## 2022-10-05 RX ORDER — ATORVASTATIN CALCIUM 20 MG/1
20 TABLET, FILM COATED ORAL EVERY EVENING
Qty: 90 TABLET | Refills: 1 | Status: SHIPPED | OUTPATIENT
Start: 2022-10-05

## 2022-10-05 RX ORDER — CEPHALEXIN 500 MG/1
500 CAPSULE ORAL EVERY 8 HOURS SCHEDULED
Qty: 21 CAPSULE | Refills: 0 | Status: SHIPPED | OUTPATIENT
Start: 2022-10-05 | End: 2022-10-12

## 2022-10-05 NOTE — PROGRESS NOTES
[facility]        NAME: Edwin Quiroz is a 60 y o  male  : 1960    MRN: [id number]  DATE: 2022  TIME: 12:46 PM    Assessment and Plan   Paronychia of right ring finger [L03 [id number]]  1  Paronychia of right ring finger  cephalexin (KEFLEX) 500 mg capsule     - Warm soaks  - Keflex as directed   - ER for worsening edema or erythema and any fevers     Patient Instructions       Follow up with PCP in 3-5 days  Proceed to  ER if symptoms worsen  Chief Complaint     Chief Complaint   Patient presents with    Hand Pain     Pt c/o pain and swelling and possible infection in 4th digit  History of Present Illness       Patient is a 59 yo male who presents with a cc of right ring finger pain and swelling x 1 day  Denies fevers, chills, fatigue, malaise, pain with finger movement       Review of Systems   Review of Systems   Constitutional: Negative for chills and fever  Musculoskeletal: Positive for arthralgias and joint swelling  4th digit pain and swelling    Skin: Positive for color change  Neurological: Negative for weakness and numbness           Current Medications       Current Outpatient Medications:     cephalexin (KEFLEX) 500 mg capsule, Take 1 capsule (500 mg total) by mouth every 8 (eight) hours for 7 days, Disp: 21 capsule, Rfl: 0    omega-3-acid ethyl esters (LOVAZA) 1 g capsule, Take 2 g by mouth 2 (two) times a day, Disp: , Rfl:     Turmeric 500 MG CAPS, Take by mouth, Disp: , Rfl:     ibuprofen (MOTRIN) 400 mg tablet, Take by mouth every 6 (six) hours as needed for mild pain (Patient not taking: Reported on 10/5/2022), Disp: , Rfl:     Current Allergies     Allergies as of 10/05/2022    (No Known Allergies)            The following portions of the patient's history were reviewed and updated as appropriate: allergies, current medications, past family history, past medical history, past social history, past surgical history and problem list      Past Medical History:   Diagnosis Date    Arthritis 07/2021    Bad arthritis in both hips        Past Surgical History:   Procedure Laterality Date    UMBILICAL HERNIA REPAIR      Last Assessed: 3/14/2016       Family History   Problem Relation Age of Onset    Other Mother         bacterial meningitis    Leukemia Sister         acute         Medications have been verified  Objective   Pulse (!) 51   Temp 97 8 °F (36 6 °C)   Resp 18   Ht 5' 5 3" (1 659 m)   Wt 99 8 kg (220 lb)   SpO2 96%   BMI 36 27 kg/m²        Physical Exam     Physical Exam  Constitutional:       General: He is not in acute distress  Appearance: He is not toxic-appearing  Cardiovascular:      Rate and Rhythm: Normal rate  Pulmonary:      Effort: Pulmonary effort is normal    Skin:     Comments: Edema and erythema of lateral nailfold of right 4th digit  No drainage  No central fluctuance  No edema, erythema, tenderness of finger pad   Neurological:      Mental Status: He is alert     Psychiatric:         Mood and Affect: Mood normal          Behavior: Behavior normal

## 2022-10-05 NOTE — TELEPHONE ENCOUNTER
----- Message from Dina Landrum DO sent at 10/4/2022  8:27 PM EDT -----  Labs are stable except cholesterol has gotten worse   He has an increased cardiovascular risk and I would recommend going on statin therapy to lower cholesterol

## 2022-10-05 NOTE — TELEPHONE ENCOUNTER
A message came in today that lacey was asking what's going on that labs were order I spoke to pt he said that he was called from lab abiodun that he had to be redrawn for a missing a lab tube but he also was asking about the call he receive this morning from [facility] about statin therapy now lacey is asking where does he need to go for this therapy

## 2022-10-05 NOTE — TELEPHONE ENCOUNTER
A statin is a cholesterol lowering drug  There is nowhere you go for it  It's not an infusion  It's a pill that is sent to the pharmacy   I would recommend atorvastatin 20mg daily

## 2022-10-06 LAB — PTH-INTACT SERPL-MCNC: 30 PG/ML (ref 15–65)

## 2022-11-22 ENCOUNTER — TELEPHONE (OUTPATIENT)
Dept: INTERNAL MEDICINE CLINIC | Facility: CLINIC | Age: 62
End: 2022-11-22

## 2022-11-22 NOTE — TELEPHONE ENCOUNTER
Tried to call his LDL on recent labs that I have went from 179 to 216 so that is why I am recommending cholesterol lowering medication

## 2022-11-22 NOTE — TELEPHONE ENCOUNTER
Would like to discuss his recent labs with Dr Holley Hummel  He had recent labs done at [facility]  He will be faxing over the results to us  His LDL went down  It went from 216 to 169

## 2023-06-01 NOTE — PROGRESS NOTES
"PT Evaluation     Today's date: 2023  Patient name: Lieutenant Valenzuela  : 1960  MRN: [id number]  Referring provider: Carito Butler MD  Dx:   Encounter Diagnosis     ICD-10-CM    1  Primary osteoarthritis of right hip  M16 11       2  Right hip pain  M25 551           Start Time: 0950  Stop Time: 1045  Total time in clinic (min): 55 minutes    Assessment  Assessment details: Pt presents s/p Right THR  Reports he has had continued pain and difficulty with mobility since surgery  Was working with  following surgery  Reports increased tightness in hip as well as lower back  Reports recent onset of \"clunking/clicking\" in right hip  Reports hips feel weak and this is chronic in nature  Decreased LE strength and mm flexibility noted  Gait altered  Reports constant discomfort in hip that increases with activity  Pt will benefit from PT tx to decrease symptoms and improve functional level  Impairments: abnormal gait, abnormal or restricted ROM, activity intolerance, impaired physical strength, lacks appropriate home exercise program and pain with function     Prognosis: good    Goals  ST  Decrease pain 50% 6 wk  2  Increase hip strength to 4+/5 6 wk  4   Pt will demonstrate normal gait pattern on level surfaces 6 wk  5  Pt will report no difficulty with light ADL's 6 wk  LT  Pt will report no pain 12 wk  2  Increase Hip ROM to WNL 12 wk  3  Increase Hip strength to WNL 12 wk  4  Pt will report no limitations with ambulation 12 wk  5  Pt will report no limitations with ADL's 12 wk  6    Pt will return to sport activity with no difficulties 12 wk    Plan  Patient would benefit from: PT eval and skilled physical therapy  Planned modality interventions: cryotherapy and thermotherapy: hydrocollator packs  Planned therapy interventions: manual therapy, joint mobilization, abdominal trunk stabilization, neuromuscular re-education, strengthening, stretching, therapeutic activities, " "therapeutic exercise, flexibility, functional ROM exercises, home exercise program and balance  Frequency: 3x week  Duration in weeks: 12  Treatment plan discussed with: patient        Subjective Evaluation    History of Present Illness  Date of surgery: 3/9/2023  Mechanism of injury: surgery  Mechanism of injury: Pt reports right THR on 3-9-23 anterior approach  No restrictions from MD at this time for movement or activity  Reports chronic issue with hip (felt weak) even in his 30's  Reports in his late 52's began walking/running and felt pain in hips  Had progression of bilateral knee pain with golf/tennis with injections for symptoms  Reports sport activity golf, tennis, pickle ball with so much pain in hip he had difficulty walking  Had injections to bilateral hips/knees  Reports he was advised to walk following surgery  Reports he got a  pre/post op  Working with  following surgery (primarily stretching)  Saw MD at 6 weeks post op and was advised to stop all activity due to pain  Advised to rest for 2 weeks  Reports returning to this region from [place] (driving)  Stiff/Tight after this  Has been active with moving furniture in house and doing outdoor work (mulch/power washing)  Uses ice twice a day  Reports significant tightness bilateral hamstrings with left worse than right  Burning tightness noted  Reports 6 week x-ray was good per Ortho MD    Reports intermittent sharp LBP with right side more symptomatic than left  Reports \"clicking/clunk\" developing recently in hip  Hip feels stable but weak      Pain  Current pain rating: 3  At best pain ratin  At worst pain ratin  Location: Right lateral hip region  Quality: discomfort  Aggravating factors: sitting    Exercise history: Golf, Tennis, Pickle ball    Patient Goals  Patient goals for therapy: decreased pain, increased strength, return to sport/leisure activities and increased motion          Objective " Concurrent Complaints      Additional Special Questions  Minimal swelling noted lateral hip region    Observations     Right Hip  Positive for effusion  Additional Observation Details  Light bruising noted right lateral hip region    Tenderness     Additional Tenderness Details  TTP right lower lumbar musculature  Minimal TTP right gluteal region and lateral hip  TTP incisional region      Active Range of Motion     Lumbar   Flexion:  Restriction level: minimal  Extension:  Restriction level: maximal  Left lateral flexion:  Restriction level: moderate  Right lateral flexion:  Restriction level: moderate  Left rotation:  Restriction level: minimal  Right rotation:  Restriction level: minimal    Additional Active Range of Motion Details  Right hip ROM WFL    Passive Range of Motion     Additional Passive Range of Motion Details  Decreased BLE mm flexiblity    Strength/Myotome Testing     Left Hip   Planes of Motion   Flexion: 4-  Extension: 4  Abduction: 4  Adduction: 4    Right Hip   Planes of Motion   Flexion: 3+  Extension: 4-  Abduction: 3+  Adduction: 4-    Left Knee   Flexion: 4  Extension: 4+    Right Knee   Flexion: 4-  Extension: 4    Left Ankle/Foot   Dorsiflexion: 4+  Plantar flexion: 4+    Right Ankle/Foot   Dorsiflexion: 4+  Plantar flexion: 4+    Tests     Lumbar     Right   Negative femoral stretch and passive SLR  Ambulation     Observational Gait   Gait: antalgic   Decreased right stance time       Additional Observational Gait Details  Reports hip tight initially with walking  Tightness in lower back reported  Discomfort/Minimal pain noted  Neuro Exam:       Balance assessments   Single leg stance   Left eyes open firm surface: 5-6  Right eyes open firm surface: 5-6             Precautions:  S/P Right THR 3-9-23       Manuals 6-2-23       Stretch LE's                                Neuro Re-Ed         Add sq        Quad/glut set        Bridge w/ t-band abd        Clam shell        t-band side stepping                        Ther Ex        nustep        TR/HR        Mini squat        St hip 3-way        palloff press        Leg ext        Leg curl                                HEP Instructed and issued HEP       Ther Activity                        Gait Training                        Modalities        MHP/CP

## 2023-06-02 ENCOUNTER — EVALUATION (OUTPATIENT)
Dept: PHYSICAL THERAPY | Facility: CLINIC | Age: 63
End: 2023-06-02

## 2023-06-02 DIAGNOSIS — M25.551 RIGHT HIP PAIN: ICD-10-CM

## 2023-06-02 DIAGNOSIS — M16.11 PRIMARY OSTEOARTHRITIS OF RIGHT HIP: Primary | ICD-10-CM

## 2023-06-02 NOTE — LETTER
"2023    Stephany Greenwood MD  [address]    Patient: Sun Shook   YOB: 1960   Date of Visit: 2023     Encounter Diagnosis     ICD-10-CM    1  Primary osteoarthritis of right hip  M16 11       2  Right hip pain  M25 551           Dear Dr Cherelle Bains:    Thank you for your recent referral of Sun Shook  Please review the attached evaluation summary from Emmanuel's recent visit  Please verify that you agree with the plan of care by signing the attached order  If you have any questions or concerns, please do not hesitate to call  I sincerely appreciate the opportunity to share in the care of one of your patients and hope to have another opportunity to work with you in the near future  Sincerely,    Saumya Abad, PT      Referring Provider:      I certify that I have read the below Plan of Care and certify the need for these services furnished under this plan of treatment while under my care  Stephany Greenwood MD  [address] [facility] [address]  Via Fax: [phone]          PT Evaluation     Today's date: 2023  Patient name: Sun Shook  : 1960  MRN: [id number]  Referring provider: Mohit Samayoa MD  Dx:   Encounter Diagnosis     ICD-10-CM    1  Primary osteoarthritis of right hip  M16 11       2  Right hip pain  M25 551           Start Time: 0950  Stop Time: 1045  Total time in clinic (min): 55 minutes    Assessment  Assessment details: Pt presents s/p Right THR  Reports he has had continued pain and difficulty with mobility since surgery  Was working with  following surgery  Reports increased tightness in hip as well as lower back  Reports recent onset of \"clunking/clicking\" in right hip  Reports hips feel weak and this is chronic in nature  Decreased LE strength and mm flexibility noted  Gait altered  Reports constant discomfort in hip that increases with activity    Pt will benefit " from PT tx to decrease symptoms and improve functional level  Impairments: abnormal gait, abnormal or restricted ROM, activity intolerance, impaired physical strength, lacks appropriate home exercise program and pain with function     Prognosis: good    Goals  ST  Decrease pain 50% 6 wk  2  Increase hip strength to 4+/5 6 wk  4   Pt will demonstrate normal gait pattern on level surfaces 6 wk  5  Pt will report no difficulty with light ADL's 6 wk  LT  Pt will report no pain 12 wk  2  Increase Hip ROM to WNL 12 wk  3  Increase Hip strength to WNL 12 wk  4  Pt will report no limitations with ambulation 12 wk  5  Pt will report no limitations with ADL's 12 wk  6  Pt will return to sport activity with no difficulties 12 wk    Plan  Patient would benefit from: PT eval and skilled physical therapy  Planned modality interventions: cryotherapy and thermotherapy: hydrocollator packs  Planned therapy interventions: manual therapy, joint mobilization, abdominal trunk stabilization, neuromuscular re-education, strengthening, stretching, therapeutic activities, therapeutic exercise, flexibility, functional ROM exercises, home exercise program and balance  Frequency: 3x week  Duration in weeks: 12  Treatment plan discussed with: patient        Subjective Evaluation    History of Present Illness  Date of surgery: 3/9/2023  Mechanism of injury: surgery  Mechanism of injury: Pt reports right THR on 3-9-23 anterior approach  No restrictions from MD at this time for movement or activity  Reports chronic issue with hip (felt weak) even in his 30's  Reports in his late 52's began walking/running and felt pain in hips  Had progression of bilateral knee pain with golf/tennis with injections for symptoms  Reports sport activity golf, tennis, pickle ball with so much pain in hip he had difficulty walking  Had injections to bilateral hips/knees  Reports he was advised to walk following surgery    Reports he got a " pre/post op  Working with  following surgery (primarily stretching)  Saw MD at 6 weeks post op and was advised to stop all activity due to pain  Advised to rest for 2 weeks  Reports returning to this region from [place] (driving)  Stiff/Tight after this  Has been active with moving furniture in house and doing outdoor work (mulch/power washing)  Uses ice twice a day  Reports significant tightness bilateral hamstrings with left worse than right  Burning tightness noted  Reports 6 week x-ray was good per Ortho MD    Reports intermittent sharp LBP with right side more symptomatic than left  Reports \"clicking/clunk\" developing recently in hip  Hip feels stable but weak  Pain  Current pain rating: 3  At best pain ratin  At worst pain ratin  Location: Right lateral hip region  Quality: discomfort  Aggravating factors: sitting    Exercise history: Golf, Tennis, Pickle ball    Patient Goals  Patient goals for therapy: decreased pain, increased strength, return to sport/leisure activities and increased motion          Objective     Concurrent Complaints      Additional Special Questions  Minimal swelling noted lateral hip region    Observations     Right Hip  Positive for effusion       Additional Observation Details  Light bruising noted right lateral hip region    Tenderness     Additional Tenderness Details  TTP right lower lumbar musculature  Minimal TTP right gluteal region and lateral hip  TTP incisional region      Active Range of Motion     Lumbar   Flexion:  Restriction level: minimal  Extension:  Restriction level: maximal  Left lateral flexion:  Restriction level: moderate  Right lateral flexion:  Restriction level: moderate  Left rotation:  Restriction level: minimal  Right rotation:  Restriction level: minimal    Additional Active Range of Motion Details  Right hip ROM WFL    Passive Range of Motion     Additional Passive Range of Motion Details  Decreased BLE mm " flexiblity    Strength/Myotome Testing     Left Hip   Planes of Motion   Flexion: 4-  Extension: 4  Abduction: 4  Adduction: 4    Right Hip   Planes of Motion   Flexion: 3+  Extension: 4-  Abduction: 3+  Adduction: 4-    Left Knee   Flexion: 4  Extension: 4+    Right Knee   Flexion: 4-  Extension: 4    Left Ankle/Foot   Dorsiflexion: 4+  Plantar flexion: 4+    Right Ankle/Foot   Dorsiflexion: 4+  Plantar flexion: 4+    Tests     Lumbar     Right   Negative femoral stretch and passive SLR  Ambulation     Observational Gait   Gait: antalgic   Decreased right stance time       Additional Observational Gait Details  Reports hip tight initially with walking  Tightness in lower back reported  Discomfort/Minimal pain noted  Neuro Exam:       Balance assessments   Single leg stance   Left eyes open firm surface: 5-6  Right eyes open firm surface: 5-6            Precautions:  S/P Right THR 3-9-23       Manuals 6-2-23       Stretch LE's                                Neuro Re-Ed         Add sq        Quad/glut set        Bridge w/ t-band abd        Clam shell        t-band side stepping                        Ther Ex        nustep        TR/HR        Mini squat        St hip 3-way        palloff press        Leg ext        Leg curl                                HEP Instructed and issued HEP       Ther Activity                        Gait Training                        Modalities        MHP/CP

## 2023-06-06 ENCOUNTER — OFFICE VISIT (OUTPATIENT)
Dept: PHYSICAL THERAPY | Facility: CLINIC | Age: 63
End: 2023-06-06
Payer: COMMERCIAL

## 2023-06-06 DIAGNOSIS — M16.11 PRIMARY OSTEOARTHRITIS OF RIGHT HIP: Primary | ICD-10-CM

## 2023-06-06 DIAGNOSIS — M25.551 RIGHT HIP PAIN: ICD-10-CM

## 2023-06-06 PROCEDURE — 97140 MANUAL THERAPY 1/> REGIONS: CPT

## 2023-06-06 PROCEDURE — 97110 THERAPEUTIC EXERCISES: CPT

## 2023-06-06 PROCEDURE — 97112 NEUROMUSCULAR REEDUCATION: CPT

## 2023-06-06 NOTE — PROGRESS NOTES
"Daily Note     Today's date: 2023  Patient name: Selene Rausch  : 1960  MRN: [id number]  Referring provider: Erskine Riedel, MD  Dx:   Encounter Diagnosis     ICD-10-CM    1  Primary osteoarthritis of right hip  M16 11       2  Right hip pain  M25 551                      Subjective:  I did the exercises at home and they were harder than I thought it would be      Objective: See treatment diary below      Assessment: Tolerated treatment well  Pt reports walking one mile yesterday with pain/weakness noted right hip region  Did need to stop one time for rest with this distance  Reports gluteal region discomfort at times as well  Difficulty noted with bridge/hip abd combined movement so isometric performed  Felt good following session  Patient would benefit from continued PT      Plan: Continue per plan of care       Precautions:  S/P Right THR 3-9-23       Manuals 23      Stretch LE's  10'                              Neuro Re-Ed         Add sq  30x  5\"      Quad/glut set        Bridge w/ t-band abd  Green abd iso  30x        Clam shell        t-band side stepping  Green 3x 25 feet  R/L                       Ther Ex        nustep  L6  12'  LE's only      TR/HR  30x      Mini squat  3x10      St hip 3-way  2#  3x10 ea      palloff press        Leg ext  15#  3x10      Leg curl  20#  3x10                              HEP Instructed and issued HEP       Ther Activity                        Gait Training                        Modalities        MHP/CP                       "

## 2023-06-16 ENCOUNTER — OFFICE VISIT (OUTPATIENT)
Dept: PHYSICAL THERAPY | Facility: CLINIC | Age: 63
End: 2023-06-16
Payer: COMMERCIAL

## 2023-06-16 DIAGNOSIS — M16.11 PRIMARY OSTEOARTHRITIS OF RIGHT HIP: Primary | ICD-10-CM

## 2023-06-16 DIAGNOSIS — M25.551 RIGHT HIP PAIN: ICD-10-CM

## 2023-06-16 PROCEDURE — 97110 THERAPEUTIC EXERCISES: CPT

## 2023-06-16 PROCEDURE — 97140 MANUAL THERAPY 1/> REGIONS: CPT

## 2023-06-16 PROCEDURE — 97112 NEUROMUSCULAR REEDUCATION: CPT

## 2023-06-16 NOTE — PROGRESS NOTES
"Daily Note     Today's date: 2023  Patient name: Conner Tse  : 1960  MRN: [id number]  Referring provider: No ref  provider found  Dx:   Encounter Diagnosis     ICD-10-CM    1  Primary osteoarthritis of right hip  M16 11       2  Right hip pain  M25 551                      Subjective: Viridiana Valdivia reports flying to [place] and dancing in a alf party and just got back yesterday  He reports increased soreness to R hip  Objective: See treatment diary below      Assessment: Improvement in R LE flexibility following manual stretching  Some discomfort to lateral hip during WB exercises but was able to complete to tolerance  Visual and VC to ensure correct exercise technique  Did not progress due to increased soreness; progress as able  Plan: Continue with current POC to address pt deficits        Precautions:  S/P Right THR 3-9-23       Manuals 23     Stretch LE's  10' 10' R LE                              Neuro Re-Ed         Add sq  30x  5\" 30x 5\"      Quad/glut set        Bridge w/ t-band abd  Green abd iso  30x   grn abd iso x30     Clam shell        t-band side stepping  Green 3x 25 feet  R/L  grn x10 R/L at bar                      Ther Ex        nustep  L6  12'  LE's only L6 12' LE's only      TR/HR  30x 30x/30x     Mini squat  3x10 3x10     St hip 3-way  2#  3x10 ea 2# 3x10 ea      palloff press        Leg ext  15#  3x10 15# 3x10     Leg curl  20#  3x10 30# 3x10                             HEP Instructed and issued HEP       Ther Activity                        Gait Training                        Modalities        MHP/CP   10'                  "

## 2023-06-19 ENCOUNTER — OFFICE VISIT (OUTPATIENT)
Dept: PHYSICAL THERAPY | Facility: CLINIC | Age: 63
End: 2023-06-19
Payer: COMMERCIAL

## 2023-06-19 DIAGNOSIS — M25.551 RIGHT HIP PAIN: ICD-10-CM

## 2023-06-19 DIAGNOSIS — M16.11 PRIMARY OSTEOARTHRITIS OF RIGHT HIP: Primary | ICD-10-CM

## 2023-06-19 PROCEDURE — 97140 MANUAL THERAPY 1/> REGIONS: CPT

## 2023-06-19 PROCEDURE — 97112 NEUROMUSCULAR REEDUCATION: CPT

## 2023-06-19 PROCEDURE — 97110 THERAPEUTIC EXERCISES: CPT

## 2023-06-19 NOTE — PROGRESS NOTES
"Daily Note     Today's date: 2023  Patient name: Conor Cerna  : 1960  MRN: [id number]  Referring provider: Lm Barnett MD  Dx:   Encounter Diagnosis     ICD-10-CM    1  Primary osteoarthritis of right hip  M16 11       2  Right hip pain  M25 551                      Subjective: Tristan Manzanares reports that he feels like the PT workout is helping his R hip  He reports performing sidestepping in pool over the weekend and was experiencing clicking but not painful clicking  Objective: See treatment diary below      Assessment: Able to perform bridges with tband hip abd iso with reports of hip flexor tightness  Clicking in R hip with standing hip abd but no pain  Visual and VC to ensure correct exercise technique in order to facilitate appropriate musculature  Improvement in LE flexibility following manual stretching  Increased resistance on machine weights as patient was no longer challenged  Progress as able  Plan: Continue with current POC to address pt deficits        Precautions:  S/P Right THR 3-9-23       Manuals 23    Stretch LE's  10' 10' R LE  10'                            Neuro Re-Ed         Add sq  30x  5\" 30x 5\"  30x 5\"     Quad/glut set        Bridge w/ t-band abd  Green abd iso  30x   grn abd iso x30 grn abd iso x30 and  glute bridge grn abd iso x20    Clam shell        t-band side stepping  Green 3x 25 feet  R/L  grn x10 R/L at bar  grn x10 R/L at bar                     Ther Ex        nustep  L6  12'  LE's only L6 12' LE's only  L6 12' LE's only     TR/HR  30x 30x/30x 30x/30x    Mini squat  3x10 3x10 3x10    St hip 3-way  2#  3x10 ea 2# 3x10 ea  2# 3x10 ea    palloff press        Leg ext  15#  3x10 15# 3x10 30# 3x10    Leg curl  20#  3x10 30# 3x10 50# 3x10                            HEP Instructed and issued HEP       Ther Activity                        Gait Training                        Modalities        MHP/CP   10' Declined                  "

## 2023-06-21 ENCOUNTER — OFFICE VISIT (OUTPATIENT)
Dept: PHYSICAL THERAPY | Facility: CLINIC | Age: 63
End: 2023-06-21
Payer: COMMERCIAL

## 2023-06-21 DIAGNOSIS — M16.11 PRIMARY OSTEOARTHRITIS OF RIGHT HIP: Primary | ICD-10-CM

## 2023-06-21 DIAGNOSIS — M25.551 RIGHT HIP PAIN: ICD-10-CM

## 2023-06-21 PROCEDURE — 97110 THERAPEUTIC EXERCISES: CPT

## 2023-06-21 PROCEDURE — 97112 NEUROMUSCULAR REEDUCATION: CPT

## 2023-06-21 PROCEDURE — 97140 MANUAL THERAPY 1/> REGIONS: CPT

## 2023-06-21 NOTE — PROGRESS NOTES
"Daily Note     Today's date: 2023  Patient name: Colt Jimenez  : 1960  MRN: [id number]  Referring provider: Angela Fernández MD  Dx:   Encounter Diagnosis     ICD-10-CM    1  Primary osteoarthritis of right hip  M16 11       2  Right hip pain  M25 551                      Subjective:   I feel like the hip is getting better  Objective: See treatment diary below      Assessment: Tolerated treatment well  New TE added and resistance increased  Reports feeling good following tx  Continued hamstring mm tightness noted with left more limited than right  Reports feeling he is walking better and performing ADL's with less difficulty  Continued Patient would benefit from continued PT      Plan: Continue per plan of care        Precautions:  S/P Right THR 3-9-23       Manuals 23   Stretch LE's  10' 10' R LE  10' 12'                           Neuro Re-Ed         Add sq  30x  5\" 30x 5\"  30x 5\"  30x  5\"   Quad/glut set        Bridge w/ t-band abd  Green abd iso  30x   grn abd iso x30 grn abd iso x30 and  glute bridge grn abd iso x20 Blue iso  30x  5\"   Clam shell        t-band side stepping  Green 3x 25 feet  R/L  grn x10 R/L at bar  grn x10 R/L at bar  Blue  4x 20 feet R/L                   Ther Ex        nustep  L6  12'  LE's only L6 12' LE's only  L6 12' LE's only  L7    LE's only    TR/HR  30x 30x/30x 30x/30x 30x ea   Mini squat  3x10 3x10 3x10 3x10   St hip 3-way  2#  3x10 ea 2# 3x10 ea  2# 3x10 ea 2#  30x ea orin   palloff press     Dbl green 2x15 ea   Leg ext  15#  3x10 15# 3x10 30# 3x10 30#  30x   Leg curl  20#  3x10 30# 3x10 50# 3x10 50#  30x                           HEP Instructed and issued HEP       Ther Activity                        Gait Training                        Modalities        MHP/CP   10' Declined                  "

## 2023-06-26 ENCOUNTER — OFFICE VISIT (OUTPATIENT)
Dept: PHYSICAL THERAPY | Facility: CLINIC | Age: 63
End: 2023-06-26
Payer: COMMERCIAL

## 2023-06-26 DIAGNOSIS — M16.11 PRIMARY OSTEOARTHRITIS OF RIGHT HIP: Primary | ICD-10-CM

## 2023-06-26 DIAGNOSIS — M25.551 RIGHT HIP PAIN: ICD-10-CM

## 2023-06-26 PROCEDURE — 97112 NEUROMUSCULAR REEDUCATION: CPT

## 2023-06-26 PROCEDURE — 97140 MANUAL THERAPY 1/> REGIONS: CPT

## 2023-06-26 PROCEDURE — 97110 THERAPEUTIC EXERCISES: CPT

## 2023-06-26 NOTE — PROGRESS NOTES
"Daily Note     Today's date: 2023  Patient name: Eddie Collier  : 1960  MRN: [id number]  Referring provider: Elza Noonan MD  Dx:   Encounter Diagnosis     ICD-10-CM    1  Primary osteoarthritis of right hip  M16 11       2  Right hip pain  M25 551                      Subjective: Edward Porras reports soreness in L LE following hitting some golf balls  Objective: See treatment diary below      Assessment: Added BOSU lunges to start working on stability of R hip; able to perform w/o handheld support  Visual and VC to ensure correct exercise technique  Improvement in flexibility following manual stretching of R hip  Appropriate fatigue following therapy session; denied any increases in pain  Progress as able  Plan: Continue with current POC to address pt deficits        Precautions:  S/P Right THR 3-9-23       Manuals 23   Stretch LE's 10' 10' 10' R LE  10' 12'                           Neuro Re-Ed         Add sq 30x 5\"  30x  5\" 30x 5\"  30x 5\"  30x  5\"   Quad/glut set        Bridge w/ t-band abd Blue iso 30x 5\"  Green abd iso  30x   grn abd iso x30 grn abd iso x30 and  glute bridge grn abd iso x20 Blue iso  30x  5\"   Clam shell        t-band side stepping Blue 3x 20 ft R/L  Green 3x 25 feet  R/L  grn x10 R/L at bar  grn x10 R/L at bar  Blue  4x 20 feet R/L   BOSU lunge 2x10               Ther Ex        nustep L7 12' LE's only  L6  12'  LE's only L6 12' LE's only  L6 12' LE's only  L7    LE's only    TR/HR 30x ea 30x 30x/30x 30x/30x 30x ea   Mini squat 3x10 3x10 3x10 3x10 3x10   St hip 3-way 2# 30x ea orin  2#  3x10 ea 2# 3x10 ea  2# 3x10 ea 2#  30x ea orin   palloff press Dbl grn 2x15 orin     Dbl green 2x15 ea   Leg ext 30# 30x 15#  3x10 15# 3x10 30# 3x10 30#  30x   Leg curl 50# 30x 20#  3x10 30# 3x10 50# 3x10 50#  30x                           HEP        Ther Activity                        Gait Training                        Modalities        MHP/CP Declined   10' " Declined

## 2023-06-28 ENCOUNTER — OFFICE VISIT (OUTPATIENT)
Dept: PHYSICAL THERAPY | Facility: CLINIC | Age: 63
End: 2023-06-28
Payer: COMMERCIAL

## 2023-06-28 DIAGNOSIS — M16.11 PRIMARY OSTEOARTHRITIS OF RIGHT HIP: Primary | ICD-10-CM

## 2023-06-28 DIAGNOSIS — M25.551 RIGHT HIP PAIN: ICD-10-CM

## 2023-06-28 PROCEDURE — 97140 MANUAL THERAPY 1/> REGIONS: CPT

## 2023-06-28 PROCEDURE — 97110 THERAPEUTIC EXERCISES: CPT

## 2023-06-28 PROCEDURE — 97112 NEUROMUSCULAR REEDUCATION: CPT

## 2023-06-28 NOTE — PROGRESS NOTES
"Daily Note     Today's date: 2023  Patient name: Gil Blount  : 1960  MRN: [id number]  Referring provider: Brian Allison MD  Dx:   Encounter Diagnosis     ICD-10-CM    1  Primary osteoarthritis of right hip  M16 11       2  Right hip pain  M25 551                      Subjective: The hip is doing ok  A little sore      Objective: See treatment diary below      Assessment: Tolerated treatment well  Reports feeling good following session and did well with new TE  Discomfort noted with passive abduction of right hip in lateral hip region  Patient would benefit from continued PT      Plan: Continue per plan of care        Precautions:  S/P Right THR 3-9-23       Manuals 23   Stretch LE's 10' 10' 10' R LE  10' 12'                           Neuro Re-Ed         Add sq 30x 5\"  30x  5\" 30x 5\"  30x 5\"  30x  5\"   [place] set        Bridge w/ t-band abd Blue iso 30x 5\"   grn abd iso x30 grn abd iso x30 and  glute bridge grn abd iso x20 Blue iso  30x  5\"   Bridge on physioball  30x  5\"      Clam shell  Blue 30x 3\"  orin      t-band side stepping Blue 3x 20 ft R/L  Blue 3x 25 feet  R/L (band at ankle) grn x10 R/L at bar  grn x10 R/L at bar  Blue  4x 20 feet R/L   BOSU lunge 2x10               Ther Ex        nustep L7 12' LE's only  L7  15'  LE's only L6 12' LE's only  L6 12' LE's only  L7    LE's only    TR/HR 30x ea  30x/30x 30x/30x 30x ea   Mini squat 3x10  3x10 3x10 3x10   St hip 3-way 2# 30x ea orin   2# 3x10 ea  2# 3x10 ea 2#  30x ea orin   palloff press Dbl grn 2x15 orin  Dbl Green 30x ea   Dbl green 2x15 ea   Leg ext 30# 30x 50#  3x10 15# 3x10 30# 3x10 30#  30x   Leg curl 50# 30x 20#  3x10 30# 3x10 50# 3x10 50#  30x   Leg Press  50#  4x10                      HEP        Ther Activity                        Gait Training                        Modalities        MHP/CP Declined   10' Declined                  "

## 2023-06-30 ENCOUNTER — OFFICE VISIT (OUTPATIENT)
Dept: PHYSICAL THERAPY | Facility: CLINIC | Age: 63
End: 2023-06-30
Payer: COMMERCIAL

## 2023-06-30 DIAGNOSIS — M16.11 PRIMARY OSTEOARTHRITIS OF RIGHT HIP: Primary | ICD-10-CM

## 2023-06-30 DIAGNOSIS — M25.551 RIGHT HIP PAIN: ICD-10-CM

## 2023-06-30 PROCEDURE — 97112 NEUROMUSCULAR REEDUCATION: CPT

## 2023-06-30 PROCEDURE — 97110 THERAPEUTIC EXERCISES: CPT

## 2023-06-30 PROCEDURE — 97140 MANUAL THERAPY 1/> REGIONS: CPT

## 2023-06-30 NOTE — PROGRESS NOTES
"Daily Note     Today's date: 2023  Patient name: Alex Guzman  : 1960  MRN: [id number]  Referring provider: Jing Anand MD  Dx:   Encounter Diagnosis     ICD-10-CM    1  Primary osteoarthritis of right hip  M16 11       2  Right hip pain  M25 551                      Subjective: The hip was good after the last session      Objective: See treatment diary below      Assessment: Tolerated treatment well  No pain noted with tx  Reports he will be golfing 9 holes on 7-3-23  Varied symptoms noted right hip  Would benefit from continued strengthening  Patient would benefit from continued PT      Plan: Continue per plan of care        Precautions:  S/P Right THR 3-9-23       Manuals 23   Stretch LE's 10' 10 10' R LE  10 12                           Neuro Re-Ed         Add sq 30x 5\"  30x  5\" 30x 5\"  30x 5\"  30x  5\"   U stance w/ low reach   3x10 orin  (step w/ 2 riser)     Bridge w/ t-band abd Blue iso 30x 5\"    grn abd iso x30 and  glute bridge grn abd iso x20 Blue iso  30x  5\"   Bridge on physioball  30x  5\" 30x  5\"     Clam shell  Blue 30x 3\"  orin Blue 30x  3-5\"     t-band side stepping Blue 3x 20 ft R/L  Blue 3x 25 feet  R/L (band at ankle) Blue 3x 25 feet R/L (band at ankle) grn x10 R/L at bar  Blue  4x 20 feet R/L   BOSU lunge 2x10               Ther Ex        nustep L7 12' LE's only  L7  15'  LE's only L7  15' LE's only  L6 12' LE's only  L7    LE's only    TR/HR 30x ea  30x/30x 30x/30x 30x ea   Mini squat 3x10  3x10 3x10 3x10   St hip 3-way 2# 30x ea orin    2# 3x10 ea 2#  30x ea orin   palloff press Dbl grn 2x15 orin  Dbl Green 30x ea Blue  30x ea  Dbl green 2x15 ea   Leg ext 30# 30x 50#  3x10 70# 3x10 30# 3x10 30#  30x   Leg curl 50# 30x 20#  3x10 60# 3x10 50# 3x10 50#  30x   Leg Press  50#  4x10 70#  3x10                     HEP        Ther Activity                        Gait Training                        Modalities        MHP/CP Declined    Declined       "

## 2023-07-03 ENCOUNTER — OFFICE VISIT (OUTPATIENT)
Dept: PHYSICAL THERAPY | Facility: CLINIC | Age: 63
End: 2023-07-03
Payer: COMMERCIAL

## 2023-07-03 DIAGNOSIS — M25.551 RIGHT HIP PAIN: ICD-10-CM

## 2023-07-03 DIAGNOSIS — M16.11 PRIMARY OSTEOARTHRITIS OF RIGHT HIP: Primary | ICD-10-CM

## 2023-07-03 PROCEDURE — 97112 NEUROMUSCULAR REEDUCATION: CPT

## 2023-07-03 PROCEDURE — 97110 THERAPEUTIC EXERCISES: CPT

## 2023-07-03 PROCEDURE — 97140 MANUAL THERAPY 1/> REGIONS: CPT

## 2023-07-03 NOTE — PROGRESS NOTES
Daily Note     Today's date: 7/3/2023  Patient name: Nilesh Stockton  : 1960  MRN: [id number]  Referring provider: Evie Lizama MD  Dx:   Encounter Diagnosis     ICD-10-CM    1. Primary osteoarthritis of right hip  M16.11       2. Right hip pain  M25.551                      Subjective:  I did a 1.5 mile hike over the weekend. It went ok. The hip was a little sore with it. I can tell it needs more strength      Objective: See treatment diary below      Assessment: Tolerated treatment well. Reports one episode of numbness right hip/thigh region for a short time prior to hiking. This did self resolve and has not happened again. Hip strength improving but still weak with activity. Patient would benefit from continued PT      Plan: Continue per plan of care.       Precautions:  S/P Right THR 3-9-23       Manuals 6/26/23 6-28-23 6/30/23 7/3/23 6-21-23   Stretch LE's 10' 10' 10' R LE  10' 12'                           Neuro Re-Ed         Add sq 30x 5"  30x  5" 30x 5"  30x 5"  30x  5"   U stance w/ low reach   3x10 orin  (step w/ 2 riser) 3x10 orin (step w/ 2 riser)    Bridge w/ t-band abd Blue iso 30x 5"     Blue iso  30x  5"   Bridge on physioball  30x  5" 30x  5" 30x  5"    Clam shell  Blue 30x 3"  orin Blue 30x  3-5" Blue 30x ea  5"    t-band side stepping Blue 3x 20 ft R/L  Blue 3x 25 feet  R/L (band at ankle) Blue 3x 25 feet R/L (band at ankle) Blue 3x 25 feet R/L (band at ankle) Blue  4x 20 feet R/L   BOSU lunge 2x10               Ther Ex        nustep L7 12' LE's only  L7  15'  LE's only L7  15' LE's only  L7 18' LE's only  L7    LE's only    TR/HR 30x ea  30x/30x 30x/30x 30x ea   Mini squat 3x10  3x10 3x10 3x10   St hip 3-way 2# 30x ea orin     2#  30x ea orin   palloff press Dbl grn 2x15 orin  Dbl Green 30x ea Blue  30x ea Blue  30x ea Dbl green 2x15 ea   Leg ext 30# 30x 50#  3x10 70# 3x10 40# 4x10 30#  30x   Leg curl 50# 30x 20#  3x10 60# 3x10 50# 4x10 50#  30x   Leg Press  50#  4x10 70#  3x10 70#  40x HEP        Ther Activity                        Gait Training                        Modalities        MHP/CP Declined

## 2023-07-05 ENCOUNTER — OFFICE VISIT (OUTPATIENT)
Dept: PHYSICAL THERAPY | Facility: CLINIC | Age: 63
End: 2023-07-05
Payer: COMMERCIAL

## 2023-07-05 DIAGNOSIS — M25.551 RIGHT HIP PAIN: ICD-10-CM

## 2023-07-05 DIAGNOSIS — M16.11 PRIMARY OSTEOARTHRITIS OF RIGHT HIP: Primary | ICD-10-CM

## 2023-07-05 PROCEDURE — 97112 NEUROMUSCULAR REEDUCATION: CPT

## 2023-07-05 PROCEDURE — 97140 MANUAL THERAPY 1/> REGIONS: CPT

## 2023-07-05 PROCEDURE — 97110 THERAPEUTIC EXERCISES: CPT

## 2023-07-05 NOTE — PROGRESS NOTES
Daily Note     Today's date: 2023  Patient name: Janeth Robles  : 1960  MRN: [id number]  Referring provider: Rashel Quinteros MD  Dx:   Encounter Diagnosis     ICD-10-CM    1. Primary osteoarthritis of right hip  M16.11       2. Right hip pain  M25.551                      Subjective:  I did a mile and a half walk. The hip was a little sore but not to bad      Objective: See treatment diary below      Assessment: Tolerated treatment well. Continues to feel right hip weaker than left. Varied pain noted with activity. Patient would benefit from continued PT      Plan: Continue per plan of care.       Precautions:  S/P Right THR 3-9-23       Manuals 6/26/23 6-28-23 6/30/23 7/3/23 7-5-23   Stretch LE's 10' 10 10' R LE  10' 10'                           Neuro Re-Ed         Add sq 30x 5"  30x  5" 30x 5"  30x 5"  30x  5"   U stance w/ low reach   3x10 orin  (step w/ 2 riser) 3x10 orin (step w/ 2 riser) 3x10 orin  (step w/ 2 riser)   Bridge w/ t-band abd Blue iso 30x 5"        Bridge on physioball  30x  5" 30x  5" 30x  5" 30x  5"   Clam shell  Blue 30x 3"  orin Blue 30x  3-5" Blue 30x ea  5" Blue  30x ea  5"   t-band side stepping Blue 3x 20 ft R/L  Blue 3x 25 feet  R/L (band at ankle) Blue 3x 25 feet R/L (band at ankle) Blue 3x 25 feet R/L (band at ankle) Blue  3x 25 feet R/L   BOSU lunge 2x10               Ther Ex        nustep L7 12' LE's only  L7  15'  LE's only L7  15' LE's only  L7 18' LE's only  L8  15'  LE's only    TR/HR 30x ea  30x/30x 30x/30x 30x ea   Mini squat 3x10  3x10 3x10 3x10   St hip 3-way 2# 30x ea orin        palloff press Dbl grn 2x15 orin  Dbl Green 30x ea Blue  30x ea Blue  30x ea Dbl blue 2x15 ea   Leg ext 30# 30x 50#  3x10 70# 3x10 40# 4x10 70#  30x   Leg curl 50# 30x 20#  3x10 60# 3x10 50# 4x10 50#  30x   Leg Press  50#  4x10 70#  3x10 70#  40x 90#  30x                   HEP        Ther Activity                        Gait Training                        Modalities        MHP/CP Declined

## 2023-07-07 ENCOUNTER — APPOINTMENT (OUTPATIENT)
Dept: PHYSICAL THERAPY | Facility: CLINIC | Age: 63
End: 2023-07-07
Payer: COMMERCIAL

## 2023-07-13 ENCOUNTER — OFFICE VISIT (OUTPATIENT)
Dept: PHYSICAL THERAPY | Facility: CLINIC | Age: 63
End: 2023-07-13
Payer: COMMERCIAL

## 2023-07-13 DIAGNOSIS — M25.551 RIGHT HIP PAIN: ICD-10-CM

## 2023-07-13 DIAGNOSIS — M16.11 PRIMARY OSTEOARTHRITIS OF RIGHT HIP: Primary | ICD-10-CM

## 2023-07-13 PROCEDURE — 97112 NEUROMUSCULAR REEDUCATION: CPT

## 2023-07-13 PROCEDURE — 97110 THERAPEUTIC EXERCISES: CPT

## 2023-07-13 PROCEDURE — 97140 MANUAL THERAPY 1/> REGIONS: CPT

## 2023-07-13 NOTE — PROGRESS NOTES
Daily Note     Today's date: 2023  Patient name: Aleksandr Ventura  : 1960  MRN: [id number]  Referring provider: Nayeli Granados MD  Dx:   Encounter Diagnosis     ICD-10-CM    1. Primary osteoarthritis of right hip  M16.11       2. Right hip pain  M25.551                      Subjective:  Reports feeling improved strength and less discomfort right hip while traveling recently      Objective: See treatment diary below      Assessment: Tolerated treatment well. Reports he will be golfing more this week. Feels continued improvement right hip. Patient would benefit from continued PT      Plan: Continue per plan of care.       Precautions:  S/P Right THR 3-9-23       Manuals 7/13/23 6-28-23 6/30/23 7/3/23 7-5-23   Stretch LE's 10' 10' 10' R LE  10' 10'                           Neuro Re-Ed         Add sq 30x 5"  30x  5" 30x 5"  30x 5"  30x  5"   U stance w/ low reach   3x10 orin  (step w/ 2 riser) 3x10 orin (step w/ 2 riser) 3x10 orin  (step w/ 2 riser)   Bridge w/ t-band abd Blue iso 30x 5"        Bridge on physioball 30x 5" 30x  5" 30x  5" 30x  5" 30x  5"   Clam shell Blue 30x  5" orin Blue 30x 3"  orin Blue 30x  3-5" Blue 30x ea  5" Blue  30x ea  5"   t-band side stepping Blue 3x 20 ft R/L  Blue 3x 25 feet  R/L (band at ankle) Blue 3x 25 feet R/L (band at ankle) Blue 3x 25 feet R/L (band at ankle) Blue  3x 25 feet R/L   BOSU lunge                Ther Ex        nustep L8 15' LE's only  L7  15'  LE's only L7  15' LE's only  L7 18' LE's only  L8  15'  LE's only    TR/HR 30x ea  30x/30x 30x/30x 30x ea   Mini squat 3x10  3x10 3x10 3x10   St hip 3-way        palloff press Dbl grn 2x15 orin  Dbl Green 30x ea Blue  30x ea Blue  30x ea Dbl blue 2x15 ea   Leg ext 70# 30x 50#  3x10 70# 3x10 40# 4x10 70#  30x   Leg curl 70# 30x 20#  3x10 60# 3x10 50# 4x10 50#  30x   Leg Press 90#  35x 50#  4x10 70#  3x10 70#  40x 90#  30x                   HEP        Ther Activity                        Gait Training Modalities        MHP/CP

## 2023-07-14 ENCOUNTER — APPOINTMENT (OUTPATIENT)
Dept: PHYSICAL THERAPY | Facility: CLINIC | Age: 63
End: 2023-07-14
Payer: COMMERCIAL

## 2023-07-17 ENCOUNTER — OFFICE VISIT (OUTPATIENT)
Dept: PHYSICAL THERAPY | Facility: CLINIC | Age: 63
End: 2023-07-17
Payer: COMMERCIAL

## 2023-07-17 DIAGNOSIS — M16.11 PRIMARY OSTEOARTHRITIS OF RIGHT HIP: Primary | ICD-10-CM

## 2023-07-17 DIAGNOSIS — M25.551 RIGHT HIP PAIN: ICD-10-CM

## 2023-07-17 PROCEDURE — 97110 THERAPEUTIC EXERCISES: CPT

## 2023-07-17 PROCEDURE — 97112 NEUROMUSCULAR REEDUCATION: CPT

## 2023-07-17 PROCEDURE — 97140 MANUAL THERAPY 1/> REGIONS: CPT

## 2023-07-17 NOTE — PROGRESS NOTES
Daily Note     Today's date: 2023  Patient name: Emeka Sharma  : 1960  MRN: [id number]  Referring provider: Katherin Ren MD  Dx:   Encounter Diagnosis     ICD-10-CM    1. Primary osteoarthritis of right hip  M16.11       2. Right hip pain  M25.551                      Subjective:  I golfed and played 9 holes. The hip was ok      Objective: See treatment diary below      Assessment: Tolerated treatment well. Reports golfing 9 holes. Walked with pull cart. Reports feeling one episode of discomfort right hip hitting  the second time however this resolved quickly with no lingering issues. To play 18 holes this Wednesday. Reports stretching prior to playing. No pain noted with tx. Patient would benefit from continued PT      Plan: Continue per plan of care.       Precautions:  S/P Right THR 3-9-23       Manuals 7/13/23 7-17-23 6/30/23 7/3/23 7-5-23   Stretch LE's 10' 10' 10' R LE  10' 10'                           Neuro Re-Ed         Add sq 30x 5"  30x  5" 30x 5"  30x 5"  30x  5"   U stance w/ low reach   3x10 orin  (step w/ 2 riser) 3x10 orin (step w/ 2 riser) 3x10 orin  (step w/ 2 riser)   Bridge w/ t-band abd Blue iso 30x 5"  Blue iso 30x  5"      Bridge on physioball 30x 5" 30x  5" 30x  5" 30x  5" 30x  5"   Clam shell Blue 30x  5" orin Blue 30x 3"  orin Blue 30x  3-5" Blue 30x ea  5" Blue  30x ea  5"   t-band side stepping Blue 3x 20 ft R/L  Blue 3x 25 feet  R/L (band at ankle) Blue 3x 25 feet R/L (band at ankle) Blue 3x 25 feet R/L (band at ankle) Blue  3x 25 feet R/L   BOSU Squat  30x               Ther Ex        nustep L8 15' LE's only  L8  15'  LE's only L7  15' LE's only  L7 18' LE's only  L8  15'  LE's only    TR/HR 30x ea 30x 30x/30x 30x/30x 30x ea   Mini squat 3x10  3x10 3x10 3x10           palloff press Dbl grn 2x15 orin  Dbl Blue 30x ea Blue  30x ea Blue  30x ea Dbl blue 2x15 ea   Leg ext 70# 30x 80#  3x10 70# 3x10 40# 4x10 70#  30x   Leg curl 70# 30x 80#  3x10 60# 3x10 50# 4x10 50#  30x Leg Press 90#  35x 110#  35x 70#  3x10 70#  40x 90#  30x                   HEP        Ther Activity                        Gait Training                        Modalities        MHP/CP

## 2023-07-19 ENCOUNTER — APPOINTMENT (OUTPATIENT)
Dept: PHYSICAL THERAPY | Facility: CLINIC | Age: 63
End: 2023-07-19
Payer: COMMERCIAL

## 2023-07-21 ENCOUNTER — OFFICE VISIT (OUTPATIENT)
Dept: PHYSICAL THERAPY | Facility: CLINIC | Age: 63
End: 2023-07-21
Payer: COMMERCIAL

## 2023-07-21 DIAGNOSIS — M16.11 PRIMARY OSTEOARTHRITIS OF RIGHT HIP: Primary | ICD-10-CM

## 2023-07-21 DIAGNOSIS — M25.551 RIGHT HIP PAIN: ICD-10-CM

## 2023-07-21 PROCEDURE — 97140 MANUAL THERAPY 1/> REGIONS: CPT

## 2023-07-21 PROCEDURE — 97112 NEUROMUSCULAR REEDUCATION: CPT

## 2023-07-21 PROCEDURE — 97110 THERAPEUTIC EXERCISES: CPT

## 2023-07-21 NOTE — PROGRESS NOTES
Daily Note     Today's date: 2023  Patient name: Gurvinder Morris  : 1960  MRN: [id number]  Referring provider: Junior Preciado MD  Dx:   Encounter Diagnosis     ICD-10-CM    1. Primary osteoarthritis of right hip  M16.11       2. Right hip pain  M25.551                      Subjective:  I golfed 18 holes on Wednesday and it went well. I did a lot of standing/walking yesterday and the hip was ok. Today my right lower back is bothering me. Its really tight      Objective: See treatment diary below      Assessment: Tolerated treatment well. Reports improving functional level as he his golfing more. Intermittent pain with golf swing primarily when swinging . Reports hamstring mm feeling improved with PT. Patient would benefit from continued PT      Plan: Continue per plan of care.       Precautions:  S/P Right THR 3-9-23       Manuals 7/13/23 7-17-23 7/21/23 7/3/23 7-5-23   Stretch LE's 10' 10' 10' R LE  10' 10'                           Neuro Re-Ed         Add sq 30x 5"  30x  5" 30x 5"  30x 5"  30x  5"   U stance w/ low reach    3x10 orin (step w/ 2 riser) 3x10 orin  (step w/ 2 riser)   Bridge w/ t-band abd Blue iso 30x 5"  Blue iso 30x  5" Blue iso 30x  5"     Bridge on physioball 30x 5" 30x  5" 30x  5" 30x  5" 30x  5"   Clam shell Blue 30x  5" orin Blue 30x 3"  orin Blue 30x  3-5" Blue 30x ea  5" Blue  30x ea  5"   t-band side stepping Blue 3x 20 ft R/L  Blue 3x 25 feet  R/L (band at ankle) Blue 3x 25 feet R/L (band at ankle) Blue 3x 25 feet R/L (band at ankle) Blue  3x 25 feet R/L   BOSU Squat  30x  30x             Ther Ex        nustep L8 15' LE's only  L8  15'  LE's only L8  15' LE's only  L7 18' LE's only  L8  15'  LE's only    TR/HR 30x ea 30x 30x/30x 30x/30x 30x ea   Mini squat 3x10   3x10 3x10           palloff press Dbl grn 2x15 orin  Dbl Blue 30x ea Blue  30x ea Blue  30x ea Dbl blue 2x15 ea   Leg ext 70# 30x 80#  3x10 80# 3x10 40# 4x10 70#  30x   Leg curl 70# 30x 80#  3x10 80# 3x10 50# 4x10 50#  30x   Leg Press 90#  35x 110#  35x 120#  3x10 70#  40x 90#  30x                   HEP        Ther Activity                        Gait Training                        Modalities        MHP/CP

## 2023-07-25 ENCOUNTER — OFFICE VISIT (OUTPATIENT)
Dept: PHYSICAL THERAPY | Facility: CLINIC | Age: 63
End: 2023-07-25
Payer: COMMERCIAL

## 2023-07-25 DIAGNOSIS — M16.11 PRIMARY OSTEOARTHRITIS OF RIGHT HIP: Primary | ICD-10-CM

## 2023-07-25 DIAGNOSIS — M25.551 RIGHT HIP PAIN: ICD-10-CM

## 2023-07-25 PROCEDURE — 97112 NEUROMUSCULAR REEDUCATION: CPT

## 2023-07-25 PROCEDURE — 97110 THERAPEUTIC EXERCISES: CPT

## 2023-07-25 PROCEDURE — 97140 MANUAL THERAPY 1/> REGIONS: CPT

## 2023-07-25 NOTE — PROGRESS NOTES
Daily Note     Today's date: 2023  Patient name: Devon Lane  : 1960  MRN: [id number]  Referring provider: Oswaldo Alva MD  Dx:   Encounter Diagnosis     ICD-10-CM    1. Primary osteoarthritis of right hip  M16.11       2. Right hip pain  M25.551                      Subjective: The back is tight/sore. The hip is a little sore. I was golfing and I feel after. The motrin helps      Objective: See treatment diary below      Assessment: Tolerated treatment well. Notes intermittent pain right hip with golf swing, primarily with use of . General soreness noted lower back and right hip with higher activity levels. Patient would benefit from continued PT      Plan: Continue per plan of care.       Precautions:  S/P Right THR 3-9-23       Manuals 23   Stretch LE's 10' 10' 10' R LE  10' 10'                           Neuro Re-Ed         Add sq 30x 5"  30x  5" 30x 5"  30x 5"  30x  5"   U stance w/ low reach     3x10 orin  (step w/ 2 riser)   Bridge w/ t-band abd Blue iso 30x 5"  Blue iso 30x  5" Blue iso 30x  5" Blue iso 30x  5"    Bridge on physioball 30x 5" 30x  5" 30x  5" 30x  5" 30x  5"   Clam shell Blue 30x  5" orin Blue 30x 3"  orin Blue 30x  3-5" Blue 30x ea  5" Blue  30x ea  5"   t-band side stepping Blue 3x 20 ft R/L  Blue 3x 25 feet  R/L (band at ankle) Blue 3x 25 feet R/L (band at ankle) Blue 3x 25 feet R/L (band at ankle) Blue  3x 25 feet R/L   BOSU Squat  30x  30x 30x            Ther Ex        nustep L8 15' LE's only  L8  15'  LE's only L8  15' LE's only  L8 15' LE's only  L8  15'  LE's only    TR/HR 30x ea 30x 30x/30x 30x/30x 30x ea   Mini squat 3x10    3x10           palloff press Dbl grn 2x15 orin  Dbl Blue 30x ea Blue  30x ea Blue  30x ea Dbl blue 2x15 ea   Leg ext 70# 30x 80#  3x10 80# 3x10 80#  35x 70#  30x   Leg curl 70# 30x 80#  3x10 80# 3x10 80#  3x10 50#  30x   Leg Press 90#  35x 110#  35x 120#  3x10 120#  50x 90#  30x                   HEP Ther Activity                        Gait Training                        Modalities        MHP/CP

## 2023-07-27 ENCOUNTER — OFFICE VISIT (OUTPATIENT)
Dept: PHYSICAL THERAPY | Facility: CLINIC | Age: 63
End: 2023-07-27
Payer: COMMERCIAL

## 2023-07-27 DIAGNOSIS — M16.11 PRIMARY OSTEOARTHRITIS OF RIGHT HIP: Primary | ICD-10-CM

## 2023-07-27 DIAGNOSIS — M25.551 RIGHT HIP PAIN: ICD-10-CM

## 2023-07-27 PROCEDURE — 97140 MANUAL THERAPY 1/> REGIONS: CPT

## 2023-07-27 PROCEDURE — 97110 THERAPEUTIC EXERCISES: CPT

## 2023-07-27 PROCEDURE — 97112 NEUROMUSCULAR REEDUCATION: CPT

## 2023-07-27 NOTE — PROGRESS NOTES
Daily Note     Today's date: 2023  Patient name: Magaly De Souza  : 1960  MRN: [id number]  Referring provider: Danny Hernandez MD  Dx:   Encounter Diagnosis     ICD-10-CM    1. Primary osteoarthritis of right hip  M16.11       2. Right hip pain  M25.551                      Subjective:  Pt reports that his hip is feeling a little sore today. Notes that he went golfing yesterday. Objective: See treatment diary below      Assessment: Tolerated treatment well. Increased groin tightness present on both sides. Overall pt is making good progress with his strength and towards his overall long term goals. Patient would benefit from continued PT      Plan: Continue per plan of care.       Precautions:  S/P Right THR 3-9-23       Manuals 23   Stretch LE's 10' 10' 10' R LE  10' 10'                            Neuro Re-Ed         Add sq 30x 5"  30x  5" 30x 5"  30x 5"  30x5"   U stance w/ low reach        Bridge w/ t-band abd Blue iso 30x 5"  Blue iso 30x  5" Blue iso 30x  5" Blue iso 30x  5" Blue iso 30x 5"   Bridge on physioball 30x 5" 30x  5" 30x  5" 30x  5" 30x 5"   Clam shell Blue 30x  5" orin Blue 30x 3"  orin Blue 30x  3-5" Blue 30x ea  5" Blue 30x 5" ea   t-band side stepping Blue 3x 20 ft R/L  Blue 3x 25 feet  R/L (band at ankle) Blue 3x 25 feet R/L (band at ankle) Blue 3x 25 feet R/L (band at ankle) Blue 3x 25 feet R/L (band at ankle)   BOSU Squat  30x  30x 30x 30x           Ther Ex        nustep L8 15' LE's only  L8  15'  LE's only L8  15' LE's only  L8 15' LE's only  L8 15' LE's only   TR/HR 30x ea 30x 30x/30x 30x/30x 30x/30x   Mini squat 3x10               palloff press Dbl grn 2x15 orin  Dbl Blue 30x ea Blue  30x ea Blue  30x ea Blue 30x ea   Leg ext 70# 30x 80#  3x10 80# 3x10 80#  35x 80# 30x   Leg curl 70# 30x 80#  3x10 80# 3x10 80#  3x10 80# 30x   Leg Press 90#  35x 110#  35x 120#  3x10 120#  50x 120# 30x                   HEP        Ther Activity Gait Training                        Modalities        MHP/CP

## 2023-08-01 ENCOUNTER — APPOINTMENT (OUTPATIENT)
Dept: PHYSICAL THERAPY | Facility: CLINIC | Age: 63
End: 2023-08-01
Payer: COMMERCIAL

## 2023-08-03 ENCOUNTER — OFFICE VISIT (OUTPATIENT)
Dept: PHYSICAL THERAPY | Facility: CLINIC | Age: 63
End: 2023-08-03
Payer: COMMERCIAL

## 2023-08-03 DIAGNOSIS — M25.551 RIGHT HIP PAIN: ICD-10-CM

## 2023-08-03 DIAGNOSIS — M16.11 PRIMARY OSTEOARTHRITIS OF RIGHT HIP: Primary | ICD-10-CM

## 2023-08-03 PROCEDURE — 97110 THERAPEUTIC EXERCISES: CPT

## 2023-08-03 PROCEDURE — 97112 NEUROMUSCULAR REEDUCATION: CPT

## 2023-08-03 PROCEDURE — 97140 MANUAL THERAPY 1/> REGIONS: CPT

## 2023-08-03 NOTE — PROGRESS NOTES
Daily Note     Today's date: 8/3/2023  Patient name: Josh Wills  : 1960  MRN: [id number]  Referring provider: Amilcar Stallings MD  Dx:   Encounter Diagnosis     ICD-10-CM    1. Primary osteoarthritis of right hip  M16.11       2. Right hip pain  M25.551           Start Time: 0900  Stop Time: 1004  Total time in clinic (min): 64 minutes    Subjective: Le Newton reports stiffness in R hip today. Objective: See treatment diary below      Assessment: Performed the elliptical as all nustep machines were being used and added plyo box "sled pushes" this visit with appropriate fatigue following. Pt denied any increases in pain with new TE. Improvement in LE flexibility following manual stretching. Pt would continue to benefit from skilled PT. Plan: Continue with current POC to address pt deficits.       Precautions:  S/P Right THR 3-9-23       Manuals 8/3/23 7-17-23 7/21/23 7/25/23 7/27   Stretch LE's 10' 10' 10' R LE  10' 10'                            Neuro Re-Ed         Add sq Not today  30x  5" 30x 5"  30x 5"  30x5"   U stance w/ low reach        Bridge w/ t-band abd Blue iso 30x 5"  Blue iso 30x  5" Blue iso 30x  5" Blue iso 30x  5" Blue iso 30x 5"   Bridge on physioball 30x 5"  30x  5" 30x  5" 30x  5" 30x 5"   Clam shell Blue 30x 5" ea  Blue 30x 3"  orin Blue 30x  3-5" Blue 30x ea  5" Blue 30x 5" ea   t-band side stepping Blue 3x25 ft R/L (band at ankle)  Blue 3x 25 feet  R/L (band at ankle) Blue 3x 25 feet R/L (band at ankle) Blue 3x 25 feet R/L (band at ankle) Blue 3x 25 feet R/L (band at ankle)   BOSU Squat 30x 30x  30x 30x 30x           Ther Ex        nustep Elliptical L4 10' (nustep unavail)  L8  15'  LE's only L8  15' LE's only  L8 15' LE's only  L8 15' LE's only   TR/HR 30x/30x 30x 30x/30x 30x/30x 30x/30x   Mini squat        High box sled push  3 laps 25ft        palloff press Black x30 ea Dbl Blue 30x ea Blue  30x ea Blue  30x ea Blue 30x ea   Leg ext 80# 30x 80#  3x10 80# 3x10 80#  35x 80# 30x Leg curl 80# 30x 80#  3x10 80# 3x10 80#  3x10 80# 30x   Leg Press 120# 30x 110#  35x 120#  3x10 120#  50x 120# 30x                   HEP        Ther Activity                        Gait Training                        Modalities        MHP/CP

## 2023-08-10 ENCOUNTER — OFFICE VISIT (OUTPATIENT)
Dept: PHYSICAL THERAPY | Facility: CLINIC | Age: 63
End: 2023-08-10
Payer: COMMERCIAL

## 2023-08-10 DIAGNOSIS — M25.551 RIGHT HIP PAIN: ICD-10-CM

## 2023-08-10 DIAGNOSIS — M16.11 PRIMARY OSTEOARTHRITIS OF RIGHT HIP: Primary | ICD-10-CM

## 2023-08-10 PROCEDURE — 97112 NEUROMUSCULAR REEDUCATION: CPT

## 2023-08-10 PROCEDURE — 97110 THERAPEUTIC EXERCISES: CPT

## 2023-08-10 PROCEDURE — 97140 MANUAL THERAPY 1/> REGIONS: CPT

## 2023-08-10 NOTE — PROGRESS NOTES
Daily Note     Today's date: 8/10/2023  Patient name: Trever Perez  : 1960  MRN: [id number]  Referring provider: Rj Michel MD  Dx:   Encounter Diagnosis     ICD-10-CM    1. Primary osteoarthritis of right hip  M16.11       2. Right hip pain  M25.551           Start Time: 0855          Subjective: The is hip is doing alright. When I golf a few days in a row the whole body isn't good      Objective: See treatment diary below      Assessment: Tolerated treatment well. Reports golfing with varied symptoms right hip afterwards. If golfing multiple days in a row soreness noted. No pain with TE. Patient would benefit from continued PT      Plan: Continue per plan of care.       Precautions:  S/P Right THR 3-9-23       Manuals 8/3/23 8-10-23 7/21/23 7/25/23 7/27   Stretch LE's 10' 12' 10' R LE  10' 10'                            Neuro Re-Ed         Add sq Not today   30x 5"  30x 5"  30x5"   U stance w/ low reach        Bridge w/ t-band abd Blue iso 30x 5"  Blue iso 30x  5" Blue iso 30x  5" Blue iso 30x  5" Blue iso 30x 5"   Bridge on physioball 30x 5"  30x  5" 30x  5" 30x  5" 30x 5"   Clam shell Blue 30x 5" ea  Blue 30x 3"  orin Blue 30x  3-5" Blue 30x ea  5" Blue 30x 5" ea   t-band side stepping Blue 3x25 ft R/L (band at ankle)  Blue 3x 25 feet  R/L (band at ankle) Blue 3x 25 feet R/L (band at ankle) Blue 3x 25 feet R/L (band at ankle) Blue 3x 25 feet R/L (band at ankle)   BOSU Squat 30x 30x  30x 30x 30x   Bosu lunge  20x                Ther Ex        nustep Elliptical L4 10' (nustep unavail)  Elliptical L5  16' L8  15' LE's only  L8 15' LE's only  L8 15' LE's only   TR/HR 30x/30x 30x 30x/30x 30x/30x 30x/30x   Mini squat        High box sled push  3 laps 25ft  3 laps 25 feet      palloff press Black x30 ea Dbl Black 30x ea Blue  30x ea Blue  30x ea Blue 30x ea   Leg ext 80# 30x 80#  3x10 80# 3x10 80#  35x 80# 30x   Leg curl 80# 30x 80#  3x10 80# 3x10 80#  3x10 80# 30x   Leg Press 120# 30x 120#  30x 120# 3x10 120#  50x 120# 30x                   HEP        Ther Activity                        Gait Training                        Modalities        MHP/CP

## 2023-08-29 ENCOUNTER — OFFICE VISIT (OUTPATIENT)
Dept: PHYSICAL THERAPY | Facility: CLINIC | Age: 63
End: 2023-08-29
Payer: COMMERCIAL

## 2023-08-29 DIAGNOSIS — M16.11 PRIMARY OSTEOARTHRITIS OF RIGHT HIP: Primary | ICD-10-CM

## 2023-08-29 DIAGNOSIS — M25.551 RIGHT HIP PAIN: ICD-10-CM

## 2023-08-29 DIAGNOSIS — Z96.641 STATUS POST TOTAL REPLACEMENT OF RIGHT HIP: ICD-10-CM

## 2023-08-29 PROCEDURE — 97110 THERAPEUTIC EXERCISES: CPT

## 2023-08-29 PROCEDURE — 97140 MANUAL THERAPY 1/> REGIONS: CPT

## 2023-08-29 PROCEDURE — 97112 NEUROMUSCULAR REEDUCATION: CPT

## 2023-08-29 NOTE — PROGRESS NOTES
Daily Note     Today's date: 2023  Patient name: Mimi Gorman  : 1960  MRN: [id number]  Referring provider: Archie Donald MD  Dx:   Encounter Diagnosis     ICD-10-CM    1. Primary osteoarthritis of right hip  M16.11       2. Right hip pain  M25.551       3. Status post total replacement of right hip  Z96.641           Start Time: 5564  Stop Time: 1008  Total time in clinic (min): 73 minutes    Subjective: The hip is a little sore and tight. I felt it after golfing or walking a lot      Objective: See treatment diary below      Assessment: Tolerated treatment well. Reports tightness right hip with manual therapy. Reports feeling weaker from approx 2 week absence from PT. Patient would benefit from continued PT      Plan: Continue per plan of care.       Precautions:  S/P Right THR 3-9-23       Manuals 8/3/23 8-10-23 8/29/23 7/25/23 7/27   Stretch LE's 10' 12' 12'  10' 10'                            Neuro Re-Ed         Add sq Not today    30x 5"  30x5"   U stance w/ low reach        Bridge w/ t-band abd Blue iso 30x 5"  Blue iso 30x  5"  Blue iso 30x  5" Blue iso 30x 5"   Bridge on physioball 30x 5"  30x  5" 30x  5" 30x  5" 30x 5"   Clam shell Blue 30x 5" ea  Blue 30x 3"  orin  Blue 30x ea  5" Blue 30x 5" ea   t-band side stepping Blue 3x25 ft R/L (band at ankle)  Blue 3x 25 feet  R/L (band at ankle) Blue 3x 25 feet R/L (band at ankle) Blue 3x 25 feet R/L (band at ankle) Blue 3x 25 feet R/L (band at ankle)   BOSU Squat 30x 30x  30x 30x 30x   Bosu lunge  20x                Ther Ex        nustep Elliptical L4 10' (nustep unavail)  Elliptical L5  16' Elliptical L6  20'  L8 15' LE's only  L8 15' LE's only   TR/HR 30x/30x 30x 30x/30x 30x/30x 30x/30x   Mini squat        High box sled push  3 laps 25ft  3 laps 25 feet 3 laps  25 feet     palloff press Black x30 ea Dbl Black 30x ea Blue  30x ea Blue  30x ea Blue 30x ea   Leg ext 80# 30x 80#  3x10 80#  3x10 80#  35x 80# 30x   Leg curl 80# 30x 80#  3x10 80# 3x10 80#  3x10 80# 30x   Leg Press 120# 30x 120#  30x 120#  3x10 120#  50x 120# 30x                   HEP        Ther Activity                        Gait Training                        Modalities        MHP/CP

## 2023-08-31 ENCOUNTER — OFFICE VISIT (OUTPATIENT)
Dept: PHYSICAL THERAPY | Facility: CLINIC | Age: 63
End: 2023-08-31
Payer: COMMERCIAL

## 2023-08-31 DIAGNOSIS — Z96.641 STATUS POST TOTAL REPLACEMENT OF RIGHT HIP: ICD-10-CM

## 2023-08-31 DIAGNOSIS — M25.551 RIGHT HIP PAIN: ICD-10-CM

## 2023-08-31 DIAGNOSIS — M16.11 PRIMARY OSTEOARTHRITIS OF RIGHT HIP: Primary | ICD-10-CM

## 2023-08-31 PROCEDURE — 97110 THERAPEUTIC EXERCISES: CPT

## 2023-08-31 PROCEDURE — 97140 MANUAL THERAPY 1/> REGIONS: CPT

## 2023-08-31 PROCEDURE — 97112 NEUROMUSCULAR REEDUCATION: CPT

## 2023-08-31 NOTE — PROGRESS NOTES
Daily Note     Today's date: 2023  Patient name: Deb Velarde  : 1960  MRN: [id number]  Referring provider: Vianey Busby MD  Dx:   Encounter Diagnosis     ICD-10-CM    1. Primary osteoarthritis of right hip  M16.11       2. Right hip pain  M25.551       3. Status post total replacement of right hip  Z96.641                      Subjective:   I didn't golf yesterday and just took it easy and iced the hip so its feeling better today      Objective: See treatment diary below      Assessment: Tolerated treatment well. No pain noted with tx. Reports continued feeling of tightness right hip as well as less strength than before surgery. Soreness with prolonged activity and following golf. Patient would benefit from continued PT      Plan: Continue per plan of care.       Precautions:  S/P Right THR 3-9-23       Manuals 8/3/23 8-10-23 8/29/23 8/31/23 7/27   Stretch LE's 10' 12' 12'  12' 10'                            Neuro Re-Ed         Add sq Not today     30x5"           Bridge w/ t-band abd Blue iso 30x 5"  Blue iso 30x  5"  Blue iso 30x  5" Blue iso 30x 5"   Bridge on physioball 30x 5"  30x  5" 30x  5" 30x  5" 30x 5"   Clam shell Blue 30x 5" ea  Blue 30x 3"  orin  Blue 30x ea  5" Blue 30x 5" ea   t-band side stepping Blue 3x25 ft R/L (band at ankle)  Blue 3x 25 feet  R/L (band at ankle) Blue 3x 25 feet R/L (band at ankle) Blue 3x 25 feet R/L (band at ankle) Blue 3x 25 feet R/L (band at ankle)   BOSU Squat 30x 30x  30x  30x   Bosu lunge  20x                Ther Ex        nustep Elliptical L4 10' (nustep unavail)  Elliptical L5  16' Elliptical L6  20'  L6 15'  Elliptical  L8 15' LE's only   TR/HR 30x/30x 30x 30x/30x  30x/30x   Mini squat        High box sled push  3 laps 25ft  3 laps 25 feet 3 laps  25 feet     palloff press Black x30 ea Dbl Black 30x ea Blue  30x ea Blue  30x ea Blue 30x ea   Leg ext 80# 30x 80#  3x10 80#  3x10 80#  4x10 80# 30x   Leg curl 80# 30x 80#  3x10 80#  3x10 80#  4x10 80# 30x Leg Press 120# 30x 120#  30x 120#  3x10 120#  4x10 120# 30x                   HEP        Ther Activity                        Gait Training                        Modalities        MHP/CP

## 2023-09-05 ENCOUNTER — OFFICE VISIT (OUTPATIENT)
Dept: PHYSICAL THERAPY | Facility: CLINIC | Age: 63
End: 2023-09-05
Payer: COMMERCIAL

## 2023-09-05 DIAGNOSIS — M16.11 PRIMARY OSTEOARTHRITIS OF RIGHT HIP: Primary | ICD-10-CM

## 2023-09-05 DIAGNOSIS — M25.551 RIGHT HIP PAIN: ICD-10-CM

## 2023-09-05 DIAGNOSIS — Z96.641 STATUS POST TOTAL REPLACEMENT OF RIGHT HIP: ICD-10-CM

## 2023-09-05 PROCEDURE — 97110 THERAPEUTIC EXERCISES: CPT

## 2023-09-05 PROCEDURE — 97140 MANUAL THERAPY 1/> REGIONS: CPT

## 2023-09-05 PROCEDURE — 97112 NEUROMUSCULAR REEDUCATION: CPT

## 2023-09-05 NOTE — PROGRESS NOTES
Daily Note     Today's date: 2023  Patient name: Hien Nance  : 1960  MRN: [id number]  Referring provider: Melany Bradford MD  Dx:   Encounter Diagnosis     ICD-10-CM    1. Primary osteoarthritis of right hip  M16.11       2. Right hip pain  M25.551       3. Status post total replacement of right hip  Z96.641                      Subjective: Yamini Herman reports playing 9 hole golf Saturday and  and reports overall feeling less fatigued in R hip with activities but feels he is still lacking strength. Objective: See treatment diary below      Assessment: Visual and VC to ensure correct exercise technique; appropriate muscular fatigue following. Able to increase resistance on LE machines to further challenge strength with no increases in pain. Improvement in RLE flexibility following manual stretching. Progress as able. Plan: Continue with current POC to address pt deficits.       Precautions:  S/P Right THR 3-9-23       Manuals 8/3/23 8-10-23 8/29/23 8/31/23 9/5/23   Stretch LE's 10' 12' 12'  12                           Neuro Re-Ed         Add sq Not today                Bridge w/ t-band abd Blue iso 30x 5"  Blue iso 30x  5"  Blue iso 30x  5" Blue iso 30x 5"    Bridge on physioball 30x 5"  30x  5" 30x  5" 30x  5" 30x 5"    Clam shell Blue 30x 5" ea  Blue 30x 3"  orin  Blue 30x ea  5" Blue 30x ea 5"    t-band side stepping Blue 3x25 ft R/L (band at ankle)  Blue 3x 25 feet  R/L (band at ankle) Blue 3x 25 feet R/L (band at ankle) Blue 3x 25 feet R/L (band at ankle) Blue 3x25 ft R/L (band at ankle)    BOSU Squat 30x 30x  30x  30x   Bosu lunge  20x                Ther Ex        nustep Elliptical L4 10' (nustep unavail)  Elliptical L5  16' Elliptical L6  20'  L6 15'  Elliptical  L6 25'   TR/HR 30x/30x 30x 30x/30x     Mini squat        High box sled push  3 laps 25ft  3 laps 25 feet 3 laps  25 feet  3 laps 25 ft    palloff press Black x30 ea Dbl Black 30x ea Blue  30x ea Blue  30x ea Black x30 ea Leg ext 80# 30x 80#  3x10 80#  3x10 80#  4x10 85# 4x10   Leg curl 80# 30x 80#  3x10 80#  3x10 80#  4x10 85# 4x10   Leg Press 120# 30x 120#  30x 120#  3x10 120#  4x10 130# 4x10                   HEP        Ther Activity                        Gait Training                        Modalities        MHP/CP

## 2023-09-07 ENCOUNTER — OFFICE VISIT (OUTPATIENT)
Dept: PHYSICAL THERAPY | Facility: CLINIC | Age: 63
End: 2023-09-07
Payer: COMMERCIAL

## 2023-09-07 DIAGNOSIS — M25.551 RIGHT HIP PAIN: ICD-10-CM

## 2023-09-07 DIAGNOSIS — M16.11 PRIMARY OSTEOARTHRITIS OF RIGHT HIP: Primary | ICD-10-CM

## 2023-09-07 DIAGNOSIS — Z96.641 STATUS POST TOTAL REPLACEMENT OF RIGHT HIP: ICD-10-CM

## 2023-09-07 PROCEDURE — 97112 NEUROMUSCULAR REEDUCATION: CPT

## 2023-09-07 PROCEDURE — 97110 THERAPEUTIC EXERCISES: CPT

## 2023-09-07 PROCEDURE — 97140 MANUAL THERAPY 1/> REGIONS: CPT

## 2023-09-07 NOTE — LETTER
2023    MD Darius Torres[address]    Patient: Brisa Solano   YOB: 1960   Date of Visit: 2023     Encounter Diagnosis     ICD-10-CM    1. Primary osteoarthritis of right hip  M16.11       2. Right hip pain  M25.551       3. Status post total replacement of right hip  Z96.641           Dear Dr. Bridger Godinez:    Thank you for your recent referral of Brisa Solano. Please review the attached evaluation summary from Emmanuel's recent visit. Please verify that you agree with the plan of care by signing the attached order. If you have any questions or concerns, please do not hesitate to call. I sincerely appreciate the opportunity to share in the care of one of your patients and hope to have another opportunity to work with you in the near future. Sincerely,    Jerrica Guillermo, PT      Referring Provider:      I certify that I have read the below Plan of Care and certify the need for these services furnished under this plan of treatment while under my care. [facility] [place] [address]  Via Fax: [phone]          PT RE-EVALUATION     Today's date: 2023  Patient name: Brisa Solano  : 1960  MRN: [id number]  Referring provider: Marie Dominguez MD  Dx:   Encounter Diagnosis     ICD-10-CM    1. Primary osteoarthritis of right hip  M16.11       2. Right hip pain  M25.551       3. Status post total replacement of right hip  Z96.641                      Assessment  Assessment details: Pt presented s/p Right THR. Reports he has had continued pain and difficulty with mobility since surgery. Was working with  following surgery. Reports increased tightness in hip as well as lower back. Reports recent onset of "clunking/clicking" in right hip. Reports hips feel weak and this is chronic in nature. Decreased LE strength and mm flexibility noted. Gait altered.   Reports constant discomfort in hip that increases with activity. Pt will benefit from PT tx to decrease symptoms and improve functional level. 23:  Pt progressing steadily with PT tx. He reports decreased pain and improved strength right hip/LE. Gait and overall mobility improved. Pt has returned to golf. Will experience varied symptoms with this along with soreness afterwards. Continued LE mm tightness noted. Tenderness to palpation proximal right anterior hip region. Pt goal is to return to tennis but still does not feel ready for this. Reports feeling overall 50% improved. Reports no instability right hip. Pt would benefit from continued PT tx to decrease symptoms and restore normal functional level. Impairments: abnormal gait, abnormal or restricted ROM, activity intolerance, impaired physical strength, lacks appropriate home exercise program and pain with function     Prognosis: good    Goals  ST. Decrease pain 50% 6 wk  2. Increase hip strength to 4+/5 6 wk. 4.  Pt will demonstrate normal gait pattern on level surfaces 6 wk  5. Pt will report no difficulty with light ADL's 6 wk  (met/partially met)  LT. Pt will report no pain 12 wk  2. Increase Hip ROM to WNL 12 wk  3. Increase Hip strength to WNL 12 wk  4. Pt will report no limitations with ambulation 12 wk  5. Pt will report no limitations with ADL's 12 wk  6.   Pt will return to sport activity with no difficulties 12 wk  (met/partially met)    Plan  Patient would benefit from: PT eval and skilled physical therapy  Planned modality interventions: cryotherapy and thermotherapy: hydrocollator packs  Planned therapy interventions: manual therapy, joint mobilization, abdominal trunk stabilization, neuromuscular re-education, strengthening, stretching, therapeutic activities, therapeutic exercise, flexibility, functional ROM exercises, home exercise program and balance  Frequency: 3x week  Duration in weeks: 12  Treatment plan discussed with: patient        Subjective Evaluation    History of Present Illness  Date of surgery: 3/9/2023  Mechanism of injury: surgery  Mechanism of injury: Pt reports right THR on 3-9-23 anterior approach. No restrictions from MD at this time for movement or activity. Reports chronic issue with hip (felt weak) even in his 30's. Reports in his late 52's began walking/running and felt pain in hips. Had progression of bilateral knee pain with golf/tennis with injections for symptoms. Reports sport activity golf, tennis, pickle ball with so much pain in hip he had difficulty walking. Had injections to bilateral hips/knees. Reports he was advised to walk following surgery. Reports he got a  pre/post op. Working with  following surgery (primarily stretching). Saw MD at 6 weeks post op and was advised to stop all activity due to pain. Advised to rest for 2 weeks. 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 Stiff/Tight after this. Has been active with moving furniture in house and doing outdoor work (mulch/power washing). Uses ice twice a day. Reports significant tightness bilateral hamstrings with left worse than right. Burning tightness noted. Reports 6 week x-ray was good per Ortho MD.   Reports intermittent sharp LBP with right side more symptomatic than left. Reports "clicking/clunk" developing recently in hip. Hip feels stable but weak.     Patient Goals  Patient goals for therapy: decreased pain, increased strength, return to sport/leisure activities and increased motion    Pain  Current pain ratin  At best pain ratin  At worst pain ratin  Location: Right lateral hip region  Quality: discomfort, tight and dull ache  Aggravating factors: lifting (heavier activity)  Progression: improved    Exercise history: Golf, Tennis, Pickle ball          Objective     Concurrent Complaints      Additional Special Questions  Minimal swelling noted lateral hip region    Tenderness     Additional Tenderness Details  TTP right lower lumbar musculature  Minimal TTP right gluteal region and lateral hip  TTP incisional region    TTP right hip flexor region and proximal quad    Active Range of Motion     Lumbar   Flexion:  Restriction level: minimal  Extension:  Restriction level: maximal  Left lateral flexion:  Restriction level: moderate  Right lateral flexion:  Restriction level: moderate  Left rotation:  Restriction level: minimal  Right rotation:  Restriction level: minimal    Additional Active Range of Motion Details  Right hip ROM WFL    Passive Range of Motion     Additional Passive Range of Motion Details  Decreased BLE mm flexiblity    Strength/Myotome Testing     Left Hip   Planes of Motion   Flexion: 4+  Extension: 5  Abduction: 5  Adduction: 5    Right Hip   Planes of Motion   Flexion: 4  Extension: 4+  Abduction: 4+  Adduction: 4+    Left Knee   Flexion: 4+  Extension: 5    Right Knee   Flexion: 4+  Extension: 5    Left Ankle/Foot   Dorsiflexion: 5  Plantar flexion: 5    Right Ankle/Foot   Dorsiflexion: 5  Plantar flexion: 5    Tests     Lumbar     Right   Negative femoral stretch and passive SLR.      Ambulation     Observational Gait   Gait: within functional limits     Additional Observational Gait Details  Discomfort/Minimal pain noted with prolonged walking  Neuro Exam:       Balance assessments   Single leg stance   Left eyes open firm surface: 5-6  Right eyes open firm surface: 5-6       Precautions:  S/P Right THR 3-9-23       Manuals 9/7/23 8-10-23 8/29/23 8/31/23 9/5/23   Stretch LE's 12' 12' 12' 12' 12'                           Neuro Re-Ed         Add sq                Bridge w/ t-band abd  Blue iso 30x  5"  Blue iso 30x  5" Blue iso 30x 5"    Bridge on physioball 30x 5"  30x  5" 30x  5" 30x  5" 30x 5"    Clam shell  Blue 30x 3"  orin  Blue 30x ea  5" Blue 30x ea 5"    t-band side stepping Blue 3x25 ft R/L (band at ankle)  Blue 3x 25 feet  R/L (band at ankle) Blue 3x 25 feet R/L (band at ankle) Blue 3x 25 feet R/L (band at ankle) Blue 3x25 ft R/L (band at ankle)    BOSU Squat  30x  30x  30x   Bosu lunge  20x                Ther Ex        Elliptical Elliptical L6 Elliptical L5  16' Elliptical L6  20'  L6 15'  Elliptical  L6 25'   TR/HR  30x 30x/30x     Mini squat        High box sled push  3 laps 25ft  3 laps 25 feet 3 laps  25 feet  3 laps 25 ft    palloff press Black x30 ea Dbl Black 30x ea Blue  30x ea Blue  30x ea Black x30 ea   Leg ext 85# 4x10 80#  3x10 80#  3x10 80#  4x10 85# 4x10   Leg curl 85#  4x10 80#  3x10 80#  3x10 80#  4x10 85# 4x10   Leg Press 130#  4x10 120#  30x 120#  3x10 120#  4x10 130# 4x10                   HEP        Ther Activity                        Gait Training                        Modalities        MHP/CP

## 2023-09-07 NOTE — PROGRESS NOTES
PT RE-EVALUATION     Today's date: 2023  Patient name: Maritza Coleman  : 1960  MRN: [id number]  Referring provider: Lexie Lua MD  Dx:   Encounter Diagnosis     ICD-10-CM    1. Primary osteoarthritis of right hip  M16.11       2. Right hip pain  M25.551       3. Status post total replacement of right hip  Z96.641                      Assessment  Assessment details: Pt presented s/p Right THR. Reports he has had continued pain and difficulty with mobility since surgery. Was working with  following surgery. Reports increased tightness in hip as well as lower back. Reports recent onset of "clunking/clicking" in right hip. Reports hips feel weak and this is chronic in nature. Decreased LE strength and mm flexibility noted. Gait altered. Reports constant discomfort in hip that increases with activity. Pt will benefit from PT tx to decrease symptoms and improve functional level. 23:  Pt progressing steadily with PT tx. He reports decreased pain and improved strength right hip/LE. Gait and overall mobility improved. Pt has returned to golf. Will experience varied symptoms with this along with soreness afterwards. Continued LE mm tightness noted. Tenderness to palpation proximal right anterior hip region. Pt goal is to return to tennis but still does not feel ready for this. Reports feeling overall 50% improved. Reports no instability right hip. Pt would benefit from continued PT tx to decrease symptoms and restore normal functional level. Impairments: abnormal gait, abnormal or restricted ROM, activity intolerance, impaired physical strength, lacks appropriate home exercise program and pain with function     Prognosis: good    Goals  ST. Decrease pain 50% 6 wk  2. Increase hip strength to 4+/5 6 wk. 4.  Pt will demonstrate normal gait pattern on level surfaces 6 wk  5.   Pt will report no difficulty with light ADL's 6 wk  (met/partially met)  LT.  Pt will report no pain 12 wk  2. Increase Hip ROM to WNL 12 wk  3. Increase Hip strength to WNL 12 wk  4. Pt will report no limitations with ambulation 12 wk  5. Pt will report no limitations with ADL's 12 wk  6. Pt will return to sport activity with no difficulties 12 wk  (met/partially met)    Plan  Patient would benefit from: PT eval and skilled physical therapy  Planned modality interventions: cryotherapy and thermotherapy: hydrocollator packs  Planned therapy interventions: manual therapy, joint mobilization, abdominal trunk stabilization, neuromuscular re-education, strengthening, stretching, therapeutic activities, therapeutic exercise, flexibility, functional ROM exercises, home exercise program and balance  Frequency: 3x week  Duration in weeks: 12  Treatment plan discussed with: patient        Subjective Evaluation    History of Present Illness  Date of surgery: 3/9/2023  Mechanism of injury: surgery  Mechanism of injury: Pt reports right THR on 3-9-23 anterior approach. No restrictions from MD at this time for movement or activity. Reports chronic issue with hip (felt weak) even in his 30's. Reports in his late 52's began walking/running and felt pain in hips. Had progression of bilateral knee pain with golf/tennis with injections for symptoms. Reports sport activity golf, tennis, pickle ball with so much pain in hip he had difficulty walking. Had injections to bilateral hips/knees. Reports he was advised to walk following surgery. Reports he got a  pre/post op. Working with  following surgery (primarily stretching). Saw MD at 6 weeks post op and was advised to stop all activity due to pain. Advised to rest for 2 weeks. 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 Stiff/Tight after this. Has been active with moving furniture in house and doing outdoor work (mulch/power washing). Uses ice twice a day.   Reports significant tightness bilateral hamstrings with left worse than right. Burning tightness noted. Reports 6 week x-ray was good per Ortho MD.   Reports intermittent sharp LBP with right side more symptomatic than left. Reports "clicking/clunk" developing recently in hip. Hip feels stable but weak.     Patient Goals  Patient goals for therapy: decreased pain, increased strength, return to sport/leisure activities and increased motion    Pain  Current pain ratin  At best pain ratin  At worst pain ratin  Location: Right lateral hip region  Quality: discomfort, tight and dull ache  Aggravating factors: lifting (heavier activity)  Progression: improved    Exercise history: Golf, Tennis, Pickle ball          Objective     Concurrent Complaints      Additional Special Questions  Minimal swelling noted lateral hip region    Tenderness     Additional Tenderness Details  TTP right lower lumbar musculature  Minimal TTP right gluteal region and lateral hip  TTP incisional region    TTP right hip flexor region and proximal quad    Active Range of Motion     Lumbar   Flexion:  Restriction level: minimal  Extension:  Restriction level: maximal  Left lateral flexion:  Restriction level: moderate  Right lateral flexion:  Restriction level: moderate  Left rotation:  Restriction level: minimal  Right rotation:  Restriction level: minimal    Additional Active Range of Motion Details  Right hip ROM WFL    Passive Range of Motion     Additional Passive Range of Motion Details  Decreased BLE mm flexiblity    Strength/Myotome Testing     Left Hip   Planes of Motion   Flexion: 4+  Extension: 5  Abduction: 5  Adduction: 5    Right Hip   Planes of Motion   Flexion: 4  Extension: 4+  Abduction: 4+  Adduction: 4+    Left Knee   Flexion: 4+  Extension: 5    Right Knee   Flexion: 4+  Extension: 5    Left Ankle/Foot   Dorsiflexion: 5  Plantar flexion: 5    Right Ankle/Foot   Dorsiflexion: 5  Plantar flexion: 5    Tests     Lumbar     Right   Negative femoral stretch and passive SLR.      Ambulation     Observational Gait   Gait: within functional limits     Additional Observational Gait Details  Discomfort/Minimal pain noted with prolonged walking  Neuro Exam:       Balance assessments   Single leg stance   Left eyes open firm surface: 5-6  Right eyes open firm surface: 5-6       Precautions:  S/P Right THR 3-9-23       Manuals 9/7/23 8-10-23 8/29/23 8/31/23 9/5/23   Stretch LE's 12' 12' 12'  12' 12'                           Neuro Re-Ed         Add sq                Bridge w/ t-band abd  Blue iso 30x  5"  Blue iso 30x  5" Blue iso 30x 5"    Bridge on physioball 30x 5"  30x  5" 30x  5" 30x  5" 30x 5"    Clam shell  Blue 30x 3"  orin  Blue 30x ea  5" Blue 30x ea 5"    t-band side stepping Blue 3x25 ft R/L (band at ankle)  Blue 3x 25 feet  R/L (band at ankle) Blue 3x 25 feet R/L (band at ankle) Blue 3x 25 feet R/L (band at ankle) Blue 3x25 ft R/L (band at ankle)    BOSU Squat  30x  30x  30x   Bosu lunge  20x                Ther Ex        Elliptical Elliptical L6 Elliptical L5  16' Elliptical L6  20'  L6 15'  Elliptical  L6 25'   TR/HR  30x 30x/30x     Mini squat        High box sled push  3 laps 25ft  3 laps 25 feet 3 laps  25 feet  3 laps 25 ft    palloff press Black x30 ea Dbl Black 30x ea Blue  30x ea Blue  30x ea Black x30 ea   Leg ext 85# 4x10 80#  3x10 80#  3x10 80#  4x10 85# 4x10   Leg curl 85#  4x10 80#  3x10 80#  3x10 80#  4x10 85# 4x10   Leg Press 130#  4x10 120#  30x 120#  3x10 120#  4x10 130# 4x10                   HEP        Ther Activity                        Gait Training                        Modalities        MHP/CP

## 2023-09-12 ENCOUNTER — OFFICE VISIT (OUTPATIENT)
Dept: PHYSICAL THERAPY | Facility: CLINIC | Age: 63
End: 2023-09-12
Payer: COMMERCIAL

## 2023-09-12 DIAGNOSIS — M16.11 PRIMARY OSTEOARTHRITIS OF RIGHT HIP: Primary | ICD-10-CM

## 2023-09-12 DIAGNOSIS — M25.551 RIGHT HIP PAIN: ICD-10-CM

## 2023-09-12 DIAGNOSIS — Z96.641 STATUS POST TOTAL REPLACEMENT OF RIGHT HIP: ICD-10-CM

## 2023-09-12 PROCEDURE — 97110 THERAPEUTIC EXERCISES: CPT

## 2023-09-12 PROCEDURE — 97140 MANUAL THERAPY 1/> REGIONS: CPT

## 2023-09-12 NOTE — PROGRESS NOTES
Daily Note     Today's date: 2023  Patient name: Emma Liu  : 1960  MRN: [id number]  Referring provider: Moraima De La Cruz MD  Dx:   Encounter Diagnosis     ICD-10-CM    1. Primary osteoarthritis of right hip  M16.11       2. Right hip pain  M25.551       3. Status post total replacement of right hip  Z96.641                      Subjective: The hip is feeling fatigued today      Objective: See treatment diary below      Assessment: Tolerated treatment well. Modified tx due to pt reports of feeling fatigued. Reports he will be golfing the next 2 days. Reports moving furniture this past weekend without significant increase in symptoms. Patient would benefit from continued PT      Plan: Continue per plan of care.        Precautions:  S/P Right THR 3-9-23       Manuals 23   Stretch LE's 12' 15' 12'  12' 12'                           Neuro Re-Ed         Add sq                Bridge w/ t-band abd    Blue iso 30x  5" Blue iso 30x 5"    Bridge on physioball 30x 5"   30x  5" 30x  5" 30x 5"    Clam shell    Blue 30x ea  5" Blue 30x ea 5"    t-band side stepping Blue 3x25 ft R/L (band at ankle)   Blue 3x 25 feet R/L (band at ankle) Blue 3x 25 feet R/L (band at ankle) Blue 3x25 ft R/L (band at ankle)    BOSU Squat   30x  30x   Bosu lunge                Ther Ex        Elliptical Elliptical L6 Elliptical L6  15' Elliptical L6  20'  L6 15'  Elliptical  L6 25'   TR/HR   30x/30x     Mini squat        High box sled push  3 laps 25ft   3 laps  25 feet  3 laps 25 ft    palloff press Black x30 ea  Blue  30x ea Blue  30x ea Black x30 ea   Leg ext 85# 4x10  80#  3x10 80#  4x10 85# 4x10   Leg curl 85#  4x10  80#  3x10 80#  4x10 85# 4x10   Leg Press 130#  4x10  120#  3x10 120#  4x10 130# 4x10                   HEP        Ther Activity                        Gait Training                        Modalities        MHP/CP

## 2023-09-14 ENCOUNTER — APPOINTMENT (OUTPATIENT)
Dept: PHYSICAL THERAPY | Facility: CLINIC | Age: 63
End: 2023-09-14
Payer: COMMERCIAL

## 2023-09-15 ENCOUNTER — OFFICE VISIT (OUTPATIENT)
Dept: PHYSICAL THERAPY | Facility: CLINIC | Age: 63
End: 2023-09-15
Payer: COMMERCIAL

## 2023-09-15 DIAGNOSIS — Z96.641 STATUS POST TOTAL REPLACEMENT OF RIGHT HIP: ICD-10-CM

## 2023-09-15 DIAGNOSIS — M16.11 PRIMARY OSTEOARTHRITIS OF RIGHT HIP: Primary | ICD-10-CM

## 2023-09-15 DIAGNOSIS — M25.551 RIGHT HIP PAIN: ICD-10-CM

## 2023-09-15 PROCEDURE — 97140 MANUAL THERAPY 1/> REGIONS: CPT

## 2023-09-15 PROCEDURE — 97110 THERAPEUTIC EXERCISES: CPT

## 2023-09-15 PROCEDURE — 97112 NEUROMUSCULAR REEDUCATION: CPT

## 2023-09-15 NOTE — PROGRESS NOTES
Daily Note     Today's date: 9/15/2023  Patient name: Madelyn Quach  : 1960  MRN: [id number]  Referring provider: Rashad Quach MD  Dx:   Encounter Diagnosis     ICD-10-CM    1. Primary osteoarthritis of right hip  M16.11       2. Right hip pain  M25.551       3. Status post total replacement of right hip  Z96.641                      Subjective: Jona Baird reports having had his best game of golf this season scoring an 80 after working out in the gym the day prior and then stretching and performing crunches the morning of. He can now crouch down with club to eye up a put. Objective: See treatment diary below      Assessment: Visual and VC to ensure correct exercise technique. Increased intensity of sidestepping with tband placement around feet with increased muscular fatigue. Improvement in orin LE flexibility following manual stretching. Pt would continue to benefit from skilled PT. Plan: Continue with current POC to address pt deficits.       Precautions:  S/P Right THR 3-9-23       Manuals 9/7/23 9-12-23 9/15/23 8/31/23 9/5/23   Stretch LE's 12' 15' 15' 12' 12'                           Neuro Re-Ed         Add sq                Bridge w/ t-band abd    Blue iso 30x  5" Blue iso 30x 5"    Bridge on physioball 30x 5"   30x 5" 30x  5" 30x 5"    Clam shell    Blue 30x ea  5" Blue 30x ea 5"    t-band side stepping Blue 3x25 ft R/L (band at ankle)   Blue 3x25ft R/L (band around feet) -long band around feet, wrap inside legs then outside and elie cross for hands to hold  Blue 3x 25 feet R/L (band at ankle) Blue 3x25 ft R/L (band at ankle)    BOSU Squat     30x   Bosu lunge                Ther Ex        Elliptical Elliptical L6 Elliptical L6  15' Elliptical L6 15' L6 15'  Elliptical  L6 25'   TR/HR        Mini squat        High box sled push  3 laps 25ft   3 laps 25ft   3 laps 25 ft    palloff press Black x30 ea   Blue  30x ea Black x30 ea   Leg ext 85# 4x10  85# 4x1 80#  4x10 85# 4x10   Leg curl 85#  4x10 85# 4x10 80#  4x10 85# 4x10   Leg Press 130#  4x10  130# 4x10 120#  4x10 130# 4x10                   HEP        Ther Activity                        Gait Training                        Modalities        MHP/CP

## 2023-09-19 ENCOUNTER — OFFICE VISIT (OUTPATIENT)
Dept: PHYSICAL THERAPY | Facility: CLINIC | Age: 63
End: 2023-09-19
Payer: COMMERCIAL

## 2023-09-19 DIAGNOSIS — M25.551 RIGHT HIP PAIN: ICD-10-CM

## 2023-09-19 DIAGNOSIS — M16.11 PRIMARY OSTEOARTHRITIS OF RIGHT HIP: Primary | ICD-10-CM

## 2023-09-19 DIAGNOSIS — Z96.641 STATUS POST TOTAL REPLACEMENT OF RIGHT HIP: ICD-10-CM

## 2023-09-19 PROCEDURE — 97110 THERAPEUTIC EXERCISES: CPT

## 2023-09-19 PROCEDURE — 97140 MANUAL THERAPY 1/> REGIONS: CPT

## 2023-09-19 NOTE — PROGRESS NOTES
Daily Note     Today's date: 2023  Patient name: Kassandra Horowitz  : 1960  MRN: [id number]  Referring provider: Ginny Carlton MD  Dx:   Encounter Diagnosis     ICD-10-CM    1. Primary osteoarthritis of right hip  M16.11       2. Right hip pain  M25.551       3. Status post total replacement of right hip  Z96.641                      Subjective:   I'm doing alright       Objective: See treatment diary below      Assessment: Tolerated treatment well. Reports [email or web address] and feeling good with this. Using hot tub which he feels helps symptoms. Golfing without significant increase in symptoms. Patient would benefit from continued PT      Plan: Continue per plan of care.       Precautions:  S/P Right THR 3-9-23       Manuals 9/7/23 9-12-23 9/15/23 9/19/23 9/5/23   Stretch LE's 12' 15' 15' 15' 12'                           Neuro Re-Ed         Add sq                Bridge w/ t-band abd     Blue iso 30x 5"    Bridge on physioball 30x 5"   30x 5"  30x 5"    Clam shell     Blue 30x ea 5"    t-band side stepping Blue 3x25 ft R/L (band at ankle)   Blue 3x25ft R/L (band around feet) -long band around feet, wrap inside legs then outside and elie cross for hands to hold  Blue 3x 25 feet R/L (band at ankle)  Elie Cross band  Blue 3x25 ft R/L (band at ankle)    BOSU Squat     30x   Bosu lunge                Ther Ex        Elliptical Elliptical L6 Elliptical L6  15' Elliptical L6 15' L6 30'   L6 25'   TR/HR        Mini squat        High box sled push  3 laps 25ft   3 laps 25ft  3 laps  25 feet 3 laps 25 ft    palloff press Black x30 ea    Black x30 ea   Leg ext 85# 4x10  85# 4x1 90#  4x10 85# 4x10   Leg curl 85#  4x10  85# 4x10 90#  4x10 85# 4x10   Leg Press 130#  4x10  130# 4x10 150#  4x10 130# 4x10                   HEP        Ther Activity                        Gait Training                        Modalities        MHP/CP

## 2023-09-22 ENCOUNTER — OFFICE VISIT (OUTPATIENT)
Dept: PHYSICAL THERAPY | Facility: CLINIC | Age: 63
End: 2023-09-22
Payer: COMMERCIAL

## 2023-09-22 DIAGNOSIS — M25.551 RIGHT HIP PAIN: ICD-10-CM

## 2023-09-22 DIAGNOSIS — M16.11 PRIMARY OSTEOARTHRITIS OF RIGHT HIP: Primary | ICD-10-CM

## 2023-09-22 DIAGNOSIS — Z96.641 STATUS POST TOTAL REPLACEMENT OF RIGHT HIP: ICD-10-CM

## 2023-09-22 PROCEDURE — 97140 MANUAL THERAPY 1/> REGIONS: CPT

## 2023-09-22 PROCEDURE — 97110 THERAPEUTIC EXERCISES: CPT

## 2023-09-22 NOTE — PROGRESS NOTES
Daily Note     Today's date: 2023  Patient name: Mercedes Haddad  : 1960  MRN: [id number]  Referring provider: Amanda Rogers MD  Dx:   Encounter Diagnosis     ICD-10-CM    1. Primary osteoarthritis of right hip  M16.11       2. Right hip pain  M25.551       3. Status post total replacement of right hip  Z96.641           Start Time: 8733  Stop Time: 1056  Total time in clinic (min): 82 minutes    Subjective: Meseret Jaime reports some stiffness in R hip today, otherwise offers no new complaints. Objective: See treatment diary below      Assessment: Visual and VC to ensure correct exercise technique. Improvement in LE flexibility following manual stretching. Appropriate muscular fatigue following therapy session. Plan: Continue with current POC to address pt deficits.       Precautions:  S/P Right THR 3-9-23       Manuals 9/7/23 9-12-23 9/15/23 9/19/23 9-22-23   Stretch LE's 12' 15' 15' 15' 15'                           Neuro Re-Ed         Add sq                Bridge w/ t-band abd        Bridge on physioball 30x 5"   30x 5"     Clam shell        t-band side stepping Blue 3x25 ft R/L (band at ankle)   Blue 3x25ft R/L (band around feet) -long band around feet, wrap inside legs then outside and paz cross for hands to hold  Blue 3x 25 feet R/L (band at ankle)  Paz Cross band  Blue 3x25ft R/L (band at ankle) paz cross band    BOSU Squat        Bosu lunge                Ther Ex        Elliptical Elliptical L6 Elliptical L6  15' Elliptical L6 15' L6 30'   L6 30'   TR/HR        Mini squat        High box sled push  3 laps 25ft   3 laps 25ft  3 laps  25 feet 3 laps 25 ft    palloff press Black x30 ea       Leg ext 85# 4x10  85# 4x1 90#  4x10 90# 4x10   Leg curl 85#  4x10  85# 4x10 90#  4x10 90# 4x10   Leg Press 130#  4x10  130# 4x10 150#  4x10 150# 4x10                   HEP        Ther Activity                        Gait Training                        Modalities        MHP/CP

## 2023-09-26 ENCOUNTER — OFFICE VISIT (OUTPATIENT)
Dept: PHYSICAL THERAPY | Facility: CLINIC | Age: 63
End: 2023-09-26
Payer: COMMERCIAL

## 2023-09-26 DIAGNOSIS — M16.11 PRIMARY OSTEOARTHRITIS OF RIGHT HIP: Primary | ICD-10-CM

## 2023-09-26 DIAGNOSIS — Z96.641 STATUS POST TOTAL REPLACEMENT OF RIGHT HIP: ICD-10-CM

## 2023-09-26 DIAGNOSIS — M25.551 RIGHT HIP PAIN: ICD-10-CM

## 2023-09-26 PROCEDURE — 97140 MANUAL THERAPY 1/> REGIONS: CPT

## 2023-09-26 PROCEDURE — 97112 NEUROMUSCULAR REEDUCATION: CPT

## 2023-09-26 PROCEDURE — 97110 THERAPEUTIC EXERCISES: CPT

## 2023-09-26 NOTE — PROGRESS NOTES
Daily Note     Today's date: 2023  Patient name: Gita Yo  : 1960  MRN: [id number]  Referring provider: Deon Ryan MD  Dx: No diagnosis found. Subjective:   I'm feeling stronger       Objective: See treatment diary below      Assessment: Tolerated treatment well. Able to perform light running, quicker turning, and hopping without pain or significant difficulty. Prolonged standing continues to increase soreness in hip. Improved ability to perform ADL's as well as lift/carry heavier objects reported. Patient would benefit from continued PT      Plan: Continue per plan of care.       Precautions:  S/P Right THR 3-9-23       Manuals 9/26/23 9-12-23 9/15/23 9/19/23 9-22-23   Stretch LE's 12' 15' 15' 15' 15'                           Neuro Re-Ed         Add sq                Bridge w/ t-band abd        Bridge on physioball 30x 5"   30x 5"     Clam shell        t-band side stepping Blue 3x25 ft R/L (band at ankle) elie cross band   Blue 3x25ft R/L (band around feet) -long band around feet, wrap inside legs then outside and elie cross for hands to hold  Blue 3x 25 feet R/L (band at ankle)  Elie Cross band  Blue 3x25ft R/L (band at ankle) elie cross band    BOSU Squat 40x       Bosu lunge                Ther Ex        Elliptical  L10  26' Elliptical L6  15' Elliptical L6 15' L6 30'   L6 30'   TR/HR        Mini squat        High box sled push  3 laps 25ft   3 laps 25ft  3 laps  25 feet 3 laps 25 ft    palloff press Black x30 ea       Leg ext 85# 4x10  85# 4x1 90#  4x10 90# 4x10   Leg curl 85#  4x10  85# 4x10 90#  4x10 90# 4x10   Leg Press 130#  4x10  130# 4x10 150#  4x10 150# 4x10                   HEP        Ther Activity                        Gait Training                        Modalities        MHP/CP

## 2023-09-28 NOTE — PROGRESS NOTES
Daily Note     Today's date: 2023  Patient name: Deb Velarde  : 1960  MRN: [id number]  Referring provider: Vianey Busby MD  Dx:   Encounter Diagnosis     ICD-10-CM    1. Primary osteoarthritis of right hip  M16.11       2. Right hip pain  M25.551       3. Status post total replacement of right hip  Z96.641                      Subjective:   I was sore after the last session. I think it was the hopping up and down. It shows me I'm not ready for tennis yet. Objective: See treatment diary below      Assessment: Tolerated treatment well. No pain noted with TE. Reports golfing without difficulty or significant symptoms in hip. Will continue one visit before returning to [place]. Reports soreness following prior session where higher level activity tested. Patient would benefit from continued PT      Plan: Continue per plan of care.       Precautions:  S/P Right THR 3-9-23       Manuals 9/26/23 9-29-23 9/15/23 9/19/23 9-22-23   Stretch LE's 12' 12' 15' 15' 15'                           Neuro Re-Ed         Add sq                Bridge w/ t-band abd        Bridge on physioball 30x 5"   30x 5"     Clam shell        t-band side stepping Blue 3x25 ft R/L (band at ankle) elie cross band  Blue 3x25 ft R/L (band at ankle) elie cross band Blue 3x25ft R/L (band around feet) -long band around feet, wrap inside legs then outside and elie cross for hands to hold  Blue 3x 25 feet R/L (band at ankle)  Elie Cross band  Blue 3x25ft R/L (band at ankle) elie cross band    BOSU Squat 40x 40x      Bosu lunge                Ther Ex        Elliptical  L10  26' Elliptical L6  20' Elliptical L6 15' L6 30'   L6 30'   TR/HR        Mini squat        High box sled push  3 laps 25ft  3 laps 25 ft 3 laps 25ft  3 laps  25 feet 3 laps 25 ft    palloff press Black x30 ea       Leg ext 85# 4x10 85#  4x10 85# 4x1 90#  4x10 90# 4x10   Leg curl 85#  4x10 85#  4x10 85# 4x10 90#  4x10 90# 4x10   Leg Press 130#  4x10 130#  4x10 130# 4x10 150#  4x10 150# 4x10                   HEP        Ther Activity                        Gait Training                        Modalities        MHP/CP

## 2023-09-29 ENCOUNTER — OFFICE VISIT (OUTPATIENT)
Dept: PHYSICAL THERAPY | Facility: CLINIC | Age: 63
End: 2023-09-29
Payer: COMMERCIAL

## 2023-09-29 DIAGNOSIS — M25.551 RIGHT HIP PAIN: ICD-10-CM

## 2023-09-29 DIAGNOSIS — Z96.641 STATUS POST TOTAL REPLACEMENT OF RIGHT HIP: ICD-10-CM

## 2023-09-29 DIAGNOSIS — M16.11 PRIMARY OSTEOARTHRITIS OF RIGHT HIP: Primary | ICD-10-CM

## 2023-09-29 PROCEDURE — 97110 THERAPEUTIC EXERCISES: CPT

## 2023-09-29 PROCEDURE — 97112 NEUROMUSCULAR REEDUCATION: CPT

## 2023-09-29 PROCEDURE — 97140 MANUAL THERAPY 1/> REGIONS: CPT

## 2023-10-03 ENCOUNTER — OFFICE VISIT (OUTPATIENT)
Dept: PHYSICAL THERAPY | Facility: CLINIC | Age: 63
End: 2023-10-03
Payer: COMMERCIAL

## 2023-10-03 DIAGNOSIS — M16.11 PRIMARY OSTEOARTHRITIS OF RIGHT HIP: Primary | ICD-10-CM

## 2023-10-03 DIAGNOSIS — M25.551 RIGHT HIP PAIN: ICD-10-CM

## 2023-10-03 DIAGNOSIS — Z96.641 STATUS POST TOTAL REPLACEMENT OF RIGHT HIP: ICD-10-CM

## 2023-10-03 PROCEDURE — 97140 MANUAL THERAPY 1/> REGIONS: CPT

## 2023-10-03 PROCEDURE — 97110 THERAPEUTIC EXERCISES: CPT

## 2023-10-03 PROCEDURE — 97112 NEUROMUSCULAR REEDUCATION: CPT

## 2023-10-03 NOTE — PROGRESS NOTES
Daily Note     Today's date: 10/3/2023  Patient name: David Juarez  : 1960  MRN: [id number]  Referring provider: Magalis Copeland MD  Dx:   Encounter Diagnosis     ICD-10-CM    1. Primary osteoarthritis of right hip  M16.11       2. Right hip pain  M25.551       3. Status post total replacement of right hip  Z96.641                      Subjective:   I'm good      Objective: See treatment diary below      Assessment: Tolerated treatment well. Reports golfing as well as standing/walking for longer durations over the weekend. Performed ladder work as well. Soreness noted afterwards. Today will be last session at this facility. Pt returning to [place] and will continue exercise program there. D/C PT services at this facility.       Plan:  D/C PT services     Precautions:  S/P Right THR 3-9-23       Manuals 9/26/23 9-29-23 10/3/23 9/19/23 9-22-23   Stretch LE's 12' 12' 12' 15' 15'                           Neuro Re-Ed         Add sq                Bridge w/ t-band abd        Bridge on physioball 30x 5"   30x 5"     Clam shell        t-band side stepping Blue 3x25 ft R/L (band at ankle) elie cross band  Blue 3x25 ft R/L (band at ankle) elie cross band Blue 3x25ft R/L (band at ankle) elie cross band Blue 3x 25 feet R/L (band at ankle)  Elie Cross band  Blue 3x25ft R/L (band at ankle) elie cross band    BOSU Squat 40x 40x 40x     Bosu lunge                Ther Ex        Elliptical  L10  26' Elliptical L6  20' Elliptical L6 25' L6 30'   L6 30'   TR/HR        Mini squat        High box sled push  3 laps 25ft  3 laps 25 ft 3 laps 25ft  3 laps  25 feet 3 laps 25 ft    palloff press Black x30 ea       Leg ext 85# 4x10 85#  4x10 95#  45x 90#  4x10 90# 4x10   Leg curl 85#  4x10 85#  4x10 95#  45x 90#  4x10 90# 4x10   Leg Press 130#  4x10 130#  4x10 155#  45x 150#  4x10 150# 4x10                   HEP        Ther Activity                        Gait Training                        Modalities        MHP/CP

## 2024-08-03 ENCOUNTER — NURSE TRIAGE (OUTPATIENT)
Dept: OTHER | Facility: OTHER | Age: 64
End: 2024-08-03

## 2024-08-03 DIAGNOSIS — U07.1 COVID-19: Primary | ICD-10-CM

## 2024-08-03 DIAGNOSIS — N18.31 STAGE 3A CHRONIC KIDNEY DISEASE (HCC): ICD-10-CM

## 2024-08-03 RX ORDER — NIRMATRELVIR AND RITONAVIR 150-100 MG
2 KIT ORAL 2 TIMES DAILY
Qty: 20 TABLET | Refills: 0 | Status: SHIPPED | OUTPATIENT
Start: 2024-08-03 | End: 2024-08-08

## 2024-08-03 NOTE — TELEPHONE ENCOUNTER
"Regarding: Covid Positive, Chills, Congestion, Fever, Paxlovid  ----- Message from Vianey WOMACK sent at 8/3/2024 12:25 PM EDT -----  \" My  tested Positive for Covid this Morning, He has Congestion, Temperature of 100, Chills. Would like to get him Paxlovid.\"    "

## 2024-08-03 NOTE — TELEPHONE ENCOUNTER
Reason for Disposition  • [1] HIGH RISK patient AND [2] influenza exposure within the last 7 days AND [3] ONE OR MORE respiratory symptoms: cough, sore throat, runny or stuffy nose    Answer Assessment - Initial Assessment Questions  Were you within 6 feet or less, for up to 15 minutes or more with a person that has a confirmed COVID-19 test? Covid positive 8/3  Onset symptoms 8/2  Patient's wife requesting Paxlovid.    What was the date of your exposure?   Are you experiencing any symptoms attributed to the virus?  (Assess for SOB, cough, fever, difficulty breathing) Congestion, temperature was 100.0, chills, cough, body aches. Took Motrin.     HIGH RISK: Do you have any history heart or lung conditions, weakened immune system, diabetes, Asthma, CHF, HIV, COPD, Chemo, renal failure, sickle cell, etc?   Sees cardiologist in [place], takes statin, last eGFR was 49.   Had been advised by cardiologist not to take Motrin too often.    Protocols used: Coronavirus (COVID-19) Diagnosed or Suspected-ADULT-

## 2024-08-10 ENCOUNTER — TELEPHONE (OUTPATIENT)
Dept: OTHER | Facility: OTHER | Age: 64
End: 2024-08-10

## 2024-08-10 NOTE — TELEPHONE ENCOUNTER
"PT's spouse stated, \"We went to the [facility] lab to have Emmanuel's blood work done and waited for over an hour to be told no one was there to do blood work. Please have the lab reach out when they reopen.    Pt and pt's spouse does not have access to [facility]. Pt's spouse was given direct number to lab for when they reopen.     Please follow up with pt when office reopens.   "

## 2024-08-19 ENCOUNTER — APPOINTMENT (OUTPATIENT)
Dept: LAB | Facility: CLINIC | Age: 64
End: 2024-08-19
Payer: COMMERCIAL

## 2024-08-19 LAB
ALBUMIN SERPL BCG-MCNC: 3.8 G/DL (ref 3.5–5)
ALP SERPL-CCNC: 67 U/L (ref 34–104)
ALT SERPL W P-5'-P-CCNC: 15 U/L (ref 7–52)
ANION GAP SERPL CALCULATED.3IONS-SCNC: 7 MMOL/L (ref 4–13)
AST SERPL W P-5'-P-CCNC: 17 U/L (ref 13–39)
BILIRUB SERPL-MCNC: 0.5 MG/DL (ref 0.2–1)
BUN SERPL-MCNC: 26 MG/DL (ref 5–25)
CALCIUM SERPL-MCNC: 8.9 MG/DL (ref 8.4–10.2)
CHLORIDE SERPL-SCNC: 108 MMOL/L (ref 96–108)
CO2 SERPL-SCNC: 24 MMOL/L (ref 21–32)
CREAT SERPL-MCNC: 1.41 MG/DL (ref 0.6–1.3)
GFR SERPL CREATININE-BSD FRML MDRD: 51 ML/MIN/1.73SQ M
GLUCOSE P FAST SERPL-MCNC: 94 MG/DL (ref 65–99)
POTASSIUM SERPL-SCNC: 4.6 MMOL/L (ref 3.5–5.3)
PROT SERPL-MCNC: 7.5 G/DL (ref 6.4–8.4)
SODIUM SERPL-SCNC: 139 MMOL/L (ref 135–147)

## 2024-08-20 ENCOUNTER — TELEPHONE (OUTPATIENT)
Age: 64
End: 2024-08-20

## 2024-08-20 NOTE — TELEPHONE ENCOUNTER
----- Message from Brenda Le PA-C sent at 8/20/2024  7:56 AM EDT -----  Kidney function is stable. Patient is overdue for an annual physical, so I would recommend he schedule one in the near future.

## 2024-09-03 ENCOUNTER — APPOINTMENT (OUTPATIENT)
Dept: LAB | Facility: CLINIC | Age: 64
End: 2024-09-03
Payer: COMMERCIAL

## 2024-09-03 DIAGNOSIS — M51.36 DEGENERATION OF LUMBAR INTERVERTEBRAL DISC: ICD-10-CM

## 2024-09-03 DIAGNOSIS — I10 ESSENTIAL HYPERTENSION, MALIGNANT: ICD-10-CM

## 2024-09-03 LAB
ANA SER QL IA: NEGATIVE
B BURGDOR IGG+IGM SER QL IA: NEGATIVE
BASOPHILS # BLD AUTO: 0.04 THOUSANDS/ÂΜL (ref 0–0.1)
BASOPHILS NFR BLD AUTO: 1 % (ref 0–1)
CRP SERPL QL: 7.8 MG/L
EOSINOPHIL # BLD AUTO: 0.17 THOUSAND/ÂΜL (ref 0–0.61)
EOSINOPHIL NFR BLD AUTO: 3 % (ref 0–6)
ERYTHROCYTE [DISTWIDTH] IN BLOOD BY AUTOMATED COUNT: 13.5 % (ref 11.6–15.1)
ERYTHROCYTE [SEDIMENTATION RATE] IN BLOOD: 22 MM/HOUR (ref 0–19)
HCT VFR BLD AUTO: 48.1 % (ref 36.5–49.3)
HGB BLD-MCNC: 15.5 G/DL (ref 12–17)
IMM GRANULOCYTES # BLD AUTO: 0.02 THOUSAND/UL (ref 0–0.2)
IMM GRANULOCYTES NFR BLD AUTO: 0 % (ref 0–2)
LYMPHOCYTES # BLD AUTO: 1.73 THOUSANDS/ÂΜL (ref 0.6–4.47)
LYMPHOCYTES NFR BLD AUTO: 30 % (ref 14–44)
MCH RBC QN AUTO: 29.8 PG (ref 26.8–34.3)
MCHC RBC AUTO-ENTMCNC: 32.2 G/DL (ref 31.4–37.4)
MCV RBC AUTO: 92 FL (ref 82–98)
MONOCYTES # BLD AUTO: 0.67 THOUSAND/ÂΜL (ref 0.17–1.22)
MONOCYTES NFR BLD AUTO: 12 % (ref 4–12)
NEUTROPHILS # BLD AUTO: 3.21 THOUSANDS/ÂΜL (ref 1.85–7.62)
NEUTS SEG NFR BLD AUTO: 54 % (ref 43–75)
NRBC BLD AUTO-RTO: 0 /100 WBCS
PLATELET # BLD AUTO: 158 THOUSANDS/UL (ref 149–390)
PMV BLD AUTO: 11.5 FL (ref 8.9–12.7)
RBC # BLD AUTO: 5.21 MILLION/UL (ref 3.88–5.62)
URATE SERPL-MCNC: 6.6 MG/DL (ref 3.5–8.5)
WBC # BLD AUTO: 5.84 THOUSAND/UL (ref 4.31–10.16)

## 2024-09-03 PROCEDURE — 85025 COMPLETE CBC W/AUTO DIFF WBC: CPT

## 2024-09-03 PROCEDURE — 36415 COLL VENOUS BLD VENIPUNCTURE: CPT

## 2024-09-03 PROCEDURE — 84550 ASSAY OF BLOOD/URIC ACID: CPT

## 2024-09-03 PROCEDURE — 85652 RBC SED RATE AUTOMATED: CPT

## 2024-09-03 PROCEDURE — 86140 C-REACTIVE PROTEIN: CPT

## 2024-09-03 PROCEDURE — 86038 ANTINUCLEAR ANTIBODIES: CPT

## 2024-09-03 PROCEDURE — 86753 PROTOZOA ANTIBODY NOS: CPT

## 2024-09-03 PROCEDURE — 86430 RHEUMATOID FACTOR TEST QUAL: CPT

## 2024-09-03 PROCEDURE — 86666 EHRLICHIA ANTIBODY: CPT

## 2024-09-03 PROCEDURE — 87468 ANAPLSMA PHGCYTOPHLM AMP PRB: CPT

## 2024-09-03 PROCEDURE — 86618 LYME DISEASE ANTIBODY: CPT

## 2024-09-04 LAB — RHEUMATOID FACT SER QL LA: NEGATIVE

## 2024-09-05 LAB
A PHAGOCYTOPH IGG TITR SER IF: NEGATIVE {TITER}
A PHAGOCYTOPH IGM TITR SER IF: NEGATIVE {TITER}
B MICROTI IGG TITR SER: NORMAL {TITER}
B MICROTI IGM TITR SER: NORMAL {TITER}
E CHAFFEENSIS IGG TITR SER IF: NEGATIVE {TITER}
E CHAFFEENSIS IGM TITR SER IF: NEGATIVE {TITER}
RESULT/COMMENT: NORMAL
RESULT/COMMENT: NORMAL

## 2024-09-07 LAB — A PHAGOCYTOPH DNA BLD QL NAA+PROBE: NEGATIVE

## 2024-09-14 LAB — HLA-B27 QL NAA+PROBE: NORMAL

## 2024-10-09 ENCOUNTER — TELEPHONE (OUTPATIENT)
Age: 64
End: 2024-10-09

## 2024-10-09 NOTE — TELEPHONE ENCOUNTER
Called patient to schedule an [facility] visit. Patient says he is leaving for [place] tomorrow and will be there for 6 months. Declined appointment.

## 2025-05-26 ENCOUNTER — APPOINTMENT (EMERGENCY)
Dept: CT IMAGING | Facility: HOSPITAL | Age: 65
End: 2025-05-26
Payer: MEDICARE

## 2025-05-26 ENCOUNTER — HOSPITAL ENCOUNTER (EMERGENCY)
Facility: HOSPITAL | Age: 65
Discharge: HOME | End: 2025-05-26
Attending: EMERGENCY MEDICINE | Admitting: EMERGENCY MEDICINE
Payer: MEDICARE

## 2025-05-26 ENCOUNTER — NURSE TRIAGE (OUTPATIENT)
Dept: OTHER | Facility: OTHER | Age: 65
End: 2025-05-26

## 2025-05-26 VITALS
HEART RATE: 48 BPM | OXYGEN SATURATION: 97 % | SYSTOLIC BLOOD PRESSURE: 163 MMHG | WEIGHT: 204 LBS | RESPIRATION RATE: 18 BRPM | DIASTOLIC BLOOD PRESSURE: 77 MMHG | TEMPERATURE: 97.6 F | HEIGHT: 67 IN | BODY MASS INDEX: 32.02 KG/M2

## 2025-05-26 DIAGNOSIS — R51.9 HEADACHE: Primary | ICD-10-CM

## 2025-05-26 DIAGNOSIS — R93.89 ABNORMAL CT SCAN: ICD-10-CM

## 2025-05-26 LAB
ALBUMIN SERPL BCG-MCNC: 4.1 G/DL (ref 3.5–5)
ALP SERPL-CCNC: 62 U/L (ref 34–104)
ALT SERPL W P-5'-P-CCNC: 15 U/L (ref 7–52)
ANION GAP SERPL CALCULATED.3IONS-SCNC: 4 MMOL/L (ref 4–13)
APTT PPP: 33 SECONDS (ref 23–34)
AST SERPL W P-5'-P-CCNC: 18 U/L (ref 13–39)
BASOPHILS # BLD AUTO: 0.05 THOUSANDS/ÂΜL (ref 0–0.1)
BASOPHILS NFR BLD AUTO: 1 % (ref 0–1)
BILIRUB SERPL-MCNC: 0.49 MG/DL (ref 0.2–1)
BUN SERPL-MCNC: 26 MG/DL (ref 5–25)
CALCIUM SERPL-MCNC: 9 MG/DL (ref 8.4–10.2)
CHLORIDE SERPL-SCNC: 106 MMOL/L (ref 96–108)
CO2 SERPL-SCNC: 27 MMOL/L (ref 21–32)
CREAT SERPL-MCNC: 1.47 MG/DL (ref 0.6–1.3)
EOSINOPHIL # BLD AUTO: 0.18 THOUSAND/ÂΜL (ref 0–0.61)
EOSINOPHIL NFR BLD AUTO: 2 % (ref 0–6)
ERYTHROCYTE [DISTWIDTH] IN BLOOD BY AUTOMATED COUNT: 13.8 % (ref 11.6–15.1)
GFR SERPL CREATININE-BSD FRML MDRD: 49 ML/MIN/1.73SQ M
GLUCOSE SERPL-MCNC: 87 MG/DL (ref 65–140)
HCT VFR BLD AUTO: 46.6 % (ref 36.5–49.3)
HGB BLD-MCNC: 15.1 G/DL (ref 12–17)
IMM GRANULOCYTES # BLD AUTO: 0.03 THOUSAND/UL (ref 0–0.2)
IMM GRANULOCYTES NFR BLD AUTO: 0 % (ref 0–2)
INR PPP: 0.91 (ref 0.85–1.19)
LYMPHOCYTES # BLD AUTO: 2.01 THOUSANDS/ÂΜL (ref 0.6–4.47)
LYMPHOCYTES NFR BLD AUTO: 27 % (ref 14–44)
MCH RBC QN AUTO: 30.2 PG (ref 26.8–34.3)
MCHC RBC AUTO-ENTMCNC: 32.4 G/DL (ref 31.4–37.4)
MCV RBC AUTO: 93 FL (ref 82–98)
MONOCYTES # BLD AUTO: 0.77 THOUSAND/ÂΜL (ref 0.17–1.22)
MONOCYTES NFR BLD AUTO: 10 % (ref 4–12)
NEUTROPHILS # BLD AUTO: 4.5 THOUSANDS/ÂΜL (ref 1.85–7.62)
NEUTS SEG NFR BLD AUTO: 60 % (ref 43–75)
NRBC BLD AUTO-RTO: 0 /100 WBCS
PLATELET # BLD AUTO: 158 THOUSANDS/UL (ref 149–390)
PMV BLD AUTO: 9.7 FL (ref 8.9–12.7)
POTASSIUM SERPL-SCNC: 4.8 MMOL/L (ref 3.5–5.3)
PROT SERPL-MCNC: 7.1 G/DL (ref 6.4–8.4)
PROTHROMBIN TIME: 12.9 SECONDS (ref 12.3–15)
RBC # BLD AUTO: 5 MILLION/UL (ref 3.88–5.62)
SODIUM SERPL-SCNC: 137 MMOL/L (ref 135–147)
WBC # BLD AUTO: 7.54 THOUSAND/UL (ref 4.31–10.16)

## 2025-05-26 PROCEDURE — 85610 PROTHROMBIN TIME: CPT

## 2025-05-26 PROCEDURE — 99284 EMERGENCY DEPT VISIT MOD MDM: CPT

## 2025-05-26 PROCEDURE — 70496 CT ANGIOGRAPHY HEAD: CPT

## 2025-05-26 PROCEDURE — 36415 COLL VENOUS BLD VENIPUNCTURE: CPT

## 2025-05-26 PROCEDURE — 96375 TX/PRO/DX INJ NEW DRUG ADDON: CPT

## 2025-05-26 PROCEDURE — 85025 COMPLETE CBC W/AUTO DIFF WBC: CPT

## 2025-05-26 PROCEDURE — 85730 THROMBOPLASTIN TIME PARTIAL: CPT

## 2025-05-26 PROCEDURE — 96365 THER/PROPH/DIAG IV INF INIT: CPT

## 2025-05-26 PROCEDURE — 70498 CT ANGIOGRAPHY NECK: CPT

## 2025-05-26 PROCEDURE — 99285 EMERGENCY DEPT VISIT HI MDM: CPT

## 2025-05-26 PROCEDURE — 80053 COMPREHEN METABOLIC PANEL: CPT

## 2025-05-26 RX ORDER — METOCLOPRAMIDE HYDROCHLORIDE 5 MG/ML
10 INJECTION INTRAMUSCULAR; INTRAVENOUS ONCE
Status: COMPLETED | OUTPATIENT
Start: 2025-05-26 | End: 2025-05-26

## 2025-05-26 RX ORDER — DIPHENHYDRAMINE HYDROCHLORIDE 50 MG/ML
25 INJECTION, SOLUTION INTRAMUSCULAR; INTRAVENOUS ONCE
Status: COMPLETED | OUTPATIENT
Start: 2025-05-26 | End: 2025-05-26

## 2025-05-26 RX ORDER — ACETAMINOPHEN 10 MG/ML
1000 INJECTION, SOLUTION INTRAVENOUS ONCE
Status: COMPLETED | OUTPATIENT
Start: 2025-05-26 | End: 2025-05-26

## 2025-05-26 RX ADMIN — METOCLOPRAMIDE 10 MG: 5 INJECTION, SOLUTION INTRAMUSCULAR; INTRAVENOUS at 17:57

## 2025-05-26 RX ADMIN — IOHEXOL 80 ML: 350 INJECTION, SOLUTION INTRAVENOUS at 17:35

## 2025-05-26 RX ADMIN — DIPHENHYDRAMINE HYDROCHLORIDE 25 MG: 50 INJECTION, SOLUTION INTRAMUSCULAR; INTRAVENOUS at 17:57

## 2025-05-26 RX ADMIN — ACETAMINOPHEN 1000 MG: 10 INJECTION INTRAVENOUS at 16:30

## 2025-05-26 NOTE — TELEPHONE ENCOUNTER
"FOLLOW UP: None    REASON FOR CONVERSATION: Headache    SYMPTOMS: Severe (9/10) headache; lots of pressure on the left side and pain in his forehead for the past 7 days. Not improved with OTC medication. Pain is increased with ambulation /  touching chin to chest. Mild congestion. No other symptoms of illness    OTHER: None    DISPOSITION: Go to ED Now (or PCP Triage)    Reason for Disposition   Patient sounds very sick or weak to the triager    Answer Assessment - Initial Assessment Questions  1. LOCATION: \"Where does it hurt?\"         Left side of head having a lot of pressure; headaches in forehead - took Motrin and it helped a little bit, but not much. Took Tylenol with no relief. One night, was unable to put his head down on pillow    2. ONSET: \"When did the headache start?\" (e.g., minutes, hours, days)         Last seven days    3. PATTERN: \"Does the pain come and go, or has it been constant since it started?\"        Constant    4. SEVERITY: \"How bad is the pain?\" and \"What does it keep you from doing?\"  (e.g., Scale 1-10; mild, moderate, or severe)        9/10    5. RECURRENT SYMPTOM: \"Have you ever had headaches before?\" If Yes, ask: \"When was the last time?\" and \"What happened that time?\"         Denies; patient states he usually gets sinus headache    6. CAUSE: \"What do you think is causing the headache?\"        Unsure    7. MIGRAINE: \"Have you been diagnosed with migraine headaches?\" If Yes, ask: \"Is this headache similar?\"         Denies    8. HEAD INJURY: \"Has there been any recent injury to the head?\"         Denies    9. OTHER SYMPTOMS: \"Do you have any other symptoms?\" (e.g., fever, stiff neck, eye pain, sore throat, cold symptoms)        Patient had one day where he felt he may have had a fever; doesn't have chills; denies symptoms of illness. Sometimes has nasal congestion  Has a negative COVID test.    Protocols used: Headache-Adult-AH    "

## 2025-05-26 NOTE — TELEPHONE ENCOUNTER
"Regarding: severe headache  ----- Message from Radha HERBERT sent at 5/26/2025  2:27 PM EDT -----  \"My  has had severe headache for over a week. I am not sure what to do \"    "

## 2025-05-26 NOTE — ED PROVIDER NOTES
Time reflects when diagnosis was documented in both MDM as applicable and the Disposition within this note       Time User Action Codes Description Comment    5/26/2025  6:47 PM Franchesca Maldonado [R51.9] Headache     5/26/2025  6:50 PM Franchesca Maldonado [R93.89] Abnormal CT scan           ED Disposition       ED Disposition   Discharge    Condition   Stable    Date/Time   Mon May 26, 2025  6:47 PM    Comment   Emmanuel Aguilar discharge to home/self care.                   Assessment & Plan       Medical Decision Making  65 yr old male with pmh of high cholesterol presents with left sided headache for 6 days. Differential diagnosis includes migraine versus tension type headache. No headache red flags. Neurologic exam without evidence of meningismus, focal neurologic findings. Presentation not consistent with acute intracranial bleed to include SAH (lack of risk factors, headache history). Presentation not consistent with acute CNS infection to include meningitis or brain abscess, Temporal arteritis unlikely, as is acute angle closure glaucoma given history and physical findings. Presentation not consistent with other acute, emergent causes of headache at this time. Given patients age and duration of symptoms, will obtain head and neck CTA.    Patient with GFR < 60, cannot give Ketorolac. Will give IV tylenol. Labs are at patients baseline.     On reassessment, patient reports some relief with Tylenol, but not fully relieved, Will give reglan and benadryl.     CTA MPRESSION:  CT brain: No acute intracranial abnormality. If headache symptoms persist or worsen then MRI of the brain can be considered for further evaluation.  CTA head: Negative for large vessel intracranial occlusion. Focal high-grade stenosis along the left intracranial vertebral artery.  CTA neck: No extracranial carotid stenosis. The cervical vertebral arteries are patent.    Discussed with patient CTA results. Patient reports relief of HA  with reglan. Patient sat up and moved his neck around and reports headache no longer present. Discussed with patient if HA still present there are more medications to give and reassess, but patient reports relief in HA and would like to go home. Discussed signs and symptoms that would warrant return. Referral to Neurology and family medicine given (patient does not have established PCP in the [facility] as he lives in [place] half the year and his PCP is there). Patient stable for discharge. Prior to discharge, discharge instructions were discussed with patient at bedside. Patient was provided both verbal and written instructions. Patient is understanding of the discharge instructions and is agreeable to plan of care. Return precautions were discussed with patient bedside, patient verbalized understanding of signs and symptoms that would necessitate return to the ED. All questions were answered. Patient was comfortable with the plan of care and discharged to home.          Amount and/or Complexity of Data Reviewed  Labs: ordered.  Radiology: ordered. Decision-making details documented in ED Course.    Risk  Prescription drug management.        ED Course as of 05/26/25 1923   Mon May 26, 2025   1836 CTA head and neck with and without contrast  IMPRESSION:  CT brain: No acute intracranial abnormality. If headache symptoms persist or worsen then MRI of the brain can be considered for further evaluation.     CTA head: Negative for large vessel intracranial occlusion. Focal high-grade stenosis along the left intracranial vertebral artery.     CTA neck: No extracranial carotid stenosis. The cervical vertebral arteries are patent.     1841 Patient reports relief with IV tylenol and reglan and would like to be DC home.        Medications   acetaminophen (Ofirmev) injection 1,000 mg (0 mg Intravenous Stopped 5/26/25 1649)   iohexol (OMNIPAQUE) 350 MG/ML injection (MULTI-DOSE) 80 mL (80 mL Intravenous Given 5/26/25 1116)    metoclopramide (REGLAN) injection 10 mg (10 mg Intravenous Given 5/26/25 1757)   diphenhydrAMINE (BENADRYL) injection 25 mg (25 mg Intravenous Given 5/26/25 1757)       ED Risk Strat Scores                    (ISAR) Identification of Seniors at Risk  Before the illness or injury that brought you to the Emergency, did you need someone to help you on a regular basis?: 0  In the last 24 hours, have you needed more help than usual?: 0  Have you been hospitalized for one or more nights during the past 6 months?: 0  In general, do you see well?: 0  In general, do you have serious problems with your memory?: 0  Do you take more than three different medications every day?: 0  ISAR Score: 0            SBIRT 20yo+      Flowsheet Row Most Recent Value   Initial Alcohol Screen: US AUDIT-C     1. How often do you have a drink containing alcohol? 1 Filed at: 05/26/2025 1551   2. How many drinks containing alcohol do you have on a typical day you are drinking?  0 Filed at: 05/26/2025 1551   3a. Male UNDER 65: How often do you have five or more drinks on one occasion? 0 Filed at: 05/26/2025 1551   3b. FEMALE Any Age, or MALE 65+: How often do you have 4 or more drinks on one occassion? 0 Filed at: 05/26/2025 1551   Audit-C Score 1 Filed at: 05/26/2025 1551   PAYTON: How many times in the past year have you...    Used an illegal drug or used a prescription medication for non-medical reasons? Never Filed at: 05/26/2025 1551                            History of Present Illness       Chief Complaint   Patient presents with    Headache     Pt reports pressure on the left side of his head that began Wednesday. Taking OTC tylenol and Motrin without relief        Past Medical History[1]   Past Surgical History[2]   Family History[3]   Social History[4]   E-Cigarette/Vaping    E-Cigarette Use Never User       E-Cigarette/Vaping Substances    Nicotine No     THC No     CBD No     Flavoring No     Other No     Unknown No       I have  reviewed and agree with the history as documented.     65 yr old male with pmh of high cholesterol presents with left sided headache for 6 days. Also reports some left sided upper trap pain. Reports having been doing a lot of re-organizing around the house. Denies any head strike. Denies vision changes, numbness, tingling, nausea, vomiting, fever, chills, rashes, or any sick symptoms.           Review of Systems   Constitutional:  Negative for chills and fever.   HENT:  Negative for ear pain and sore throat.    Eyes:  Negative for pain and visual disturbance.   Respiratory:  Negative for cough and shortness of breath.    Cardiovascular:  Negative for chest pain and palpitations.   Gastrointestinal:  Negative for abdominal pain and vomiting.   Genitourinary:  Negative for dysuria and hematuria.   Musculoskeletal:  Positive for myalgias. Negative for arthralgias and back pain.   Skin:  Negative for color change and rash.   Neurological:  Positive for headaches. Negative for seizures and syncope.   All other systems reviewed and are negative.          Objective       ED Triage Vitals [05/26/25 1549]   Temperature Pulse Blood Pressure Respirations SpO2 Patient Position - Orthostatic VS   97.6 °F (36.4 °C) (!) 48 163/77 18 97 % Sitting      Temp Source Heart Rate Source BP Location FiO2 (%) Pain Score    Temporal Monitor Left arm -- --      Vitals      Date and Time Temp Pulse SpO2 Resp BP Pain Score FACES Pain Rating User   05/26/25 1549 97.6 °F (36.4 °C) 48 97 % 18 163/77 -- -- FS            Physical Exam  Vitals and nursing note reviewed.   Constitutional:       General: He is not in acute distress.     Appearance: He is well-developed. He is not ill-appearing, toxic-appearing or diaphoretic.   HENT:      Head: Normocephalic and atraumatic.     Eyes:      General: Lids are normal.      Extraocular Movements:      Right eye: Normal extraocular motion and no nystagmus.      Left eye: Normal extraocular motion and no  nystagmus.      Conjunctiva/sclera: Conjunctivae normal.      Pupils: Pupils are equal, round, and reactive to light.     Neck:      Meningeal: Brudzinski's sign and Kernig's sign absent.      Comments: Left sided muscular tenderness.   Cardiovascular:      Rate and Rhythm: Normal rate and regular rhythm.      Heart sounds: No murmur heard.  Pulmonary:      Effort: Pulmonary effort is normal. No respiratory distress.      Breath sounds: Normal breath sounds.   Abdominal:      Palpations: Abdomen is soft.      Tenderness: There is no abdominal tenderness.     Musculoskeletal:         General: No swelling.      Cervical back: Full passive range of motion without pain and neck supple. Muscular tenderness present. No pain with movement or spinous process tenderness.   Lymphadenopathy:      Cervical: No cervical adenopathy.     Skin:     General: Skin is warm and dry.      Capillary Refill: Capillary refill takes less than 2 seconds.     Neurological:      General: No focal deficit present.      Mental Status: He is alert and oriented to person, place, and time.      GCS: GCS eye subscore is 4. GCS verbal subscore is 5. GCS motor subscore is 6.      Cranial Nerves: Cranial nerves 2-12 are intact. No facial asymmetry.      Sensory: Sensation is intact.      Motor: Motor function is intact. No weakness, tremor or pronator drift.      Coordination: Finger-Nose-Finger Test and Heel to Shin Test normal.      Gait: Gait is intact.      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   Patient able to smile, puff out cheeks and raise eye brows with complete symmetry.    Psychiatric:         Mood and Affect: Mood normal.         Results Reviewed       Procedure Component Value Units Date/Time    Comprehensive metabolic panel [[id number]]  (Abnormal) Collected: 05/26/25 1629    Lab Status: Final result Specimen: Blood from Arm, Left Updated: 05/26/25 1651     Sodium 137 mmol/L      Potassium 4.8 mmol/L       Chloride 106 mmol/L      CO2 27 mmol/L      ANION GAP 4 mmol/L      BUN 26 mg/dL      Creatinine 1.47 mg/dL      Glucose 87 mg/dL      Calcium 9.0 mg/dL      AST 18 U/L      ALT 15 U/L      Alkaline Phosphatase 62 U/L      Total Protein 7.1 g/dL      Albumin 4.1 g/dL      Total Bilirubin 0.49 mg/dL      eGFR 49 ml/min/1.73sq m     Narrative:      National Kidney Disease Foundation guidelines for Chronic Kidney Disease (CKD):     Stage 1 with normal or high GFR (GFR > 90 mL/min/1.73 square meters)    Stage 2 Mild CKD (GFR = 60-89 mL/min/1.73 square meters)    Stage 3A Moderate CKD (GFR = 45-59 mL/min/1.73 square meters)    Stage 3B Moderate CKD (GFR = 30-44 mL/min/1.73 square meters)    Stage 4 Severe CKD (GFR = 15-29 mL/min/1.73 square meters)    Stage 5 End Stage CKD (GFR <15 mL/min/1.73 square meters)  Note: GFR calculation is accurate only with a steady state creatinine    Protime-INR [[id number]]  (Normal) Collected: 05/26/25 1629    Lab Status: Final result Specimen: Blood from Arm, Left Updated: 05/26/25 1649     Protime 12.9 seconds      INR 0.91    Narrative:      INR Therapeutic Range    Indication                                             INR Range      Atrial Fibrillation                                               2.0-3.0  Hypercoagulable State                                    2.0.2.3  Left Ventricular Asist Device                            2.0-3.0  Mechanical Heart Valve                                  -    Aortic(with afib, MI, embolism, HF, LA enlargement,    and/or coagulopathy)                                     2.0-3.0 (2.5-3.5)     Mitral                                                             2.5-3.5  Prosthetic/Bioprosthetic Heart Valve               2.0-3.0  Venous thromboembolism (VTE: VT, PE        2.0-3.0    APTT [[id number]]  (Normal) Collected: 05/26/25 1629    Lab Status: Final result Specimen: Blood from Arm, Left Updated: 05/26/25 1649     PTT 33 seconds     CBC and  differential [[id number]] Collected: 05/26/25 1629    Lab Status: Final result Specimen: Blood from Arm, Left Updated: 05/26/25 1635     WBC 7.54 Thousand/uL      RBC 5.00 Million/uL      Hemoglobin 15.1 g/dL      Hematocrit 46.6 %      MCV 93 fL      MCH 30.2 pg      MCHC 32.4 g/dL      RDW 13.8 %      MPV 9.7 fL      Platelets 158 Thousands/uL      nRBC 0 /100 WBCs      Segmented % 60 %      Immature Grans % 0 %      Lymphocytes % 27 %      Monocytes % 10 %      Eosinophils Relative 2 %      Basophils Relative 1 %      Absolute Neutrophils 4.50 Thousands/µL      Absolute Immature Grans 0.03 Thousand/uL      Absolute Lymphocytes 2.01 Thousands/µL      Absolute Monocytes 0.77 Thousand/µL      Eosinophils Absolute 0.18 Thousand/µL      Basophils Absolute 0.05 Thousands/µL             CTA head and neck with and without contrast   Final Interpretation by Ricardo Pandey MD (05/26 1831)   CT brain: No acute intracranial abnormality. If headache symptoms persist or worsen then MRI of the brain can be considered for further evaluation.      CTA head: Negative for large vessel intracranial occlusion. Focal high-grade stenosis along the left intracranial vertebral artery.      CTA neck: No extracranial carotid stenosis. The cervical vertebral arteries are patent.               Workstation performed: [id number]             Procedures    ED Medication and Procedure Management   Prior to Admission Medications   Prescriptions Last Dose Informant Patient Reported? Taking?   Turmeric 500 MG CAPS  Self Yes No   Sig: Take by mouth   atorvastatin (LIPITOR) 20 mg tablet   No No   Sig: Take 1 tablet (20 mg total) by mouth every evening   ibuprofen (MOTRIN) 400 mg tablet  Self Yes No   Sig: Take by mouth every 6 (six) hours as needed for mild pain   Patient not taking: Reported on 10/5/2022   omega-3-acid ethyl esters (LOVAZA) 1 g capsule  Self Yes No   Sig: Take 2 g by mouth 2 (two) times a day      Facility-Administered  Medications: None     Discharge Medication List as of 5/26/2025  6:50 PM        CONTINUE these medications which have NOT CHANGED    Details   atorvastatin (LIPITOR) 20 mg tablet Take 1 tablet (20 mg total) by mouth every evening, Starting Wed 10/5/2022, Normal      ibuprofen (MOTRIN) 400 mg tablet Take by mouth every 6 (six) hours as needed for mild pain, Historical Med      omega-3-acid ethyl esters (LOVAZA) 1 g capsule Take 2 g by mouth 2 (two) times a day, Historical Med      Turmeric 500 MG CAPS Take by mouth, Historical Med             ED SEPSIS DOCUMENTATION   Time reflects when diagnosis was documented in both MDM as applicable and the Disposition within this note       Time User Action Codes Description Comment    5/26/2025  6:47 PM Franchesca Maldonado [R51.9] Headache     5/26/2025  6:50 PM Franchesca Maldonado [R93.89] Abnormal CT scan                      [1]   Past Medical History:  Diagnosis Date    Arthritis 07/2021    Bad arthritis in both hips    [2]   Past Surgical History:  Procedure Laterality Date    HIP SURGERY      JOINT REPLACEMENT      TOTAL HIP ARTHROPLASTY Right     UMBILICAL HERNIA REPAIR      Last Assessed: 3/14/2016   [3]   Family History  Problem Relation Name Age of Onset    Other Mother          bacterial meningitis    Leukemia Sister          acute   [4]   Social History  Tobacco Use    Smoking status: Never    Smokeless tobacco: Never   Vaping Use    Vaping status: Never Used   Substance Use Topics    Alcohol use: Yes     Comment: rarely    Drug use: No        Franchesca Maldonado PA-C  05/26/25 1923

## 2025-05-26 NOTE — DISCHARGE INSTRUCTIONS
You have been evaluated in the Emergency Department today for headache. Your evaluation did not show evidence of medical conditions requiring emergent intervention at this time, and your pain improved with medication in the ED.    We recommend you take 600mg ibuprofen every 6 hours or tylenol 650mg every 6 hours as needed for pain. If needed, you can alternate these medications so that you take one medication every 3 hours. For instance, at noon take ibuprofen, then at 3pm take tylenol, then at 6pm take ibuprofen.    Please follow up with your primary care physician. Please follow up with Neurology. If headache persist, MRI is recommended for further evaluation.     Return to the Emergency Department if you experience worsening or uncontrolled pain, vision changes, recurrent vomiting, difficulty with normal activities, abnormal behavior, difficulty walking, numbness, weakness, or any other concerning symptoms.

## 2025-05-27 ENCOUNTER — NURSE TRIAGE (OUTPATIENT)
Dept: OTHER | Facility: OTHER | Age: 65
End: 2025-05-27

## 2025-05-27 NOTE — TELEPHONE ENCOUNTER
Warm transfer from [place]    Patient called to schedule visit s/p ED visit 5/27; said he did have headache relief after leaving ED however headache recurs despite treatment in ED and tylenol this AM; current pain level 9/10.    Discussed unable to provide medical advice as patient has never been seen by neurology; suggested he call PCP to treat or return to ED for relief.    Patient verbalized understanding; call transferred back to clerical team to schedule NP neurology visit with headache provider.

## 2025-05-27 NOTE — TELEPHONE ENCOUNTER
"Regarding: appointment request  ----- Message from Kaylen JUNG sent at 5/27/2025  7:38 AM EDT -----  \"I was seen in the ER last night. I have been having a headache for the last 7 days. ER told me if it continues to schedule an appointment to be seen ASAP. I would like to see if I can be seen today.\"    "

## 2025-05-27 NOTE — TELEPHONE ENCOUNTER
Patient was seen in the ED last night because he has been having headaches for the past 7 days.  The ED recommended he schedule an appointment with his PCP for soon as possible.  Patient was last seen by Dr. Mendez on 9/22/2022.  This RN was going to schedule an ED follow-up appointment; however, patient's chart is reflecting that he does not have a PCP at this time, so unsure if scheduling appointment would be appropriate or not.    Please contact patient to schedule an ED follow-up appointment or with recommendations.  Thank you!    Reason for Disposition   NON-URGENT call redirected to PCP's office because it is open    Protocols used: No Contact or Duplicate Contact Call-Adult-

## 2025-05-29 ENCOUNTER — OFFICE VISIT (OUTPATIENT)
Age: 65
End: 2025-05-29
Payer: MEDICARE

## 2025-05-29 VITALS
OXYGEN SATURATION: 95 % | BODY MASS INDEX: 32.77 KG/M2 | SYSTOLIC BLOOD PRESSURE: 126 MMHG | DIASTOLIC BLOOD PRESSURE: 78 MMHG | WEIGHT: 208.8 LBS | HEIGHT: 67 IN | HEART RATE: 84 BPM | RESPIRATION RATE: 18 BRPM | TEMPERATURE: 96.7 F

## 2025-05-29 DIAGNOSIS — N18.31 STAGE 3A CHRONIC KIDNEY DISEASE (HCC): Primary | ICD-10-CM

## 2025-05-29 DIAGNOSIS — E78.00 HYPERCHOLESTEREMIA: ICD-10-CM

## 2025-05-29 DIAGNOSIS — R51.9 ACUTE INTRACTABLE HEADACHE, UNSPECIFIED HEADACHE TYPE: ICD-10-CM

## 2025-05-29 PROCEDURE — G2211 COMPLEX E/M VISIT ADD ON: HCPCS | Performed by: INTERNAL MEDICINE

## 2025-05-29 PROCEDURE — 99204 OFFICE O/P NEW MOD 45 MIN: CPT | Performed by: INTERNAL MEDICINE

## 2025-05-29 RX ORDER — ROSUVASTATIN CALCIUM 10 MG/1
TABLET, COATED ORAL
COMMUNITY
Start: 2025-05-24

## 2025-05-29 RX ORDER — MULTIVITAMIN
1 TABLET ORAL DAILY
COMMUNITY

## 2025-05-29 RX ORDER — SUMATRIPTAN SUCCINATE 100 MG/1
100 TABLET ORAL ONCE AS NEEDED
Qty: 10 TABLET | Refills: 5 | Status: SHIPPED | OUTPATIENT
Start: 2025-05-29

## 2025-05-29 NOTE — ASSESSMENT & PLAN NOTE
Lab Results   Component Value Date    EGFR 49 05/26/2025    EGFR 51 08/19/2024    EGFR 49 (L) 09/29/2022    CREATININE 1.47 (H) 05/26/2025    CREATININE 1.41 (H) 08/19/2024    CREATININE 1.57 (H) 09/29/2022     Recent labs reviewed and creatinine is stable. Stay well hydrated and avoid nephrotoxins.

## 2025-05-29 NOTE — PROGRESS NOTES
Name: Emmanuel Aguilar      : 1960      MRN: [id number]  Encounter Provider: Nestor Mendez DO  Encounter Date: 2025   Encounter department: [facility]    :  Assessment & Plan  Stage 3a chronic kidney disease (HCC)  Lab Results   Component Value Date    EGFR 49 2025    EGFR 51 2024    EGFR 49 (L) 2022    CREATININE 1.47 (H) 2025    CREATININE 1.41 (H) 2024    CREATININE 1.57 (H) 2022     Recent labs reviewed and creatinine is stable. Stay well hydrated and avoid nephrotoxins.     Hypercholesteremia    Continue statin therapy. Check updated lipid panel.    Orders:  •  Lipid Panel with Direct LDL reflex; Future    Acute intractable headache, unspecified headache type    Suspect migraine. Check further labs. Neuroimaging was reassuring. Discussed use of triptan for abortive purposes. Can also take magnesium/vitamin D. Stay well hydrated. Avoid any known triggers. Refer to neurology.    Orders:  •  C-reactive protein; Future  •  Sedimentation rate, automated; Future  •  Lyme Total AB W Reflex to IGM/IGG; Future  •  SUMAtriptan (IMITREX) 100 mg tablet; Take 1 tablet (100 mg total) by mouth once as needed for migraine for up to 1 dose may repeat in 2 hours if necessary. Max dose 200mg in 24 hour period.  •  Ambulatory Referral to Neurology; Future         History of Present Illness     History of Present Illness  The patient presents for evaluation of headaches.    He has been experiencing persistent headaches for several days, a symptom he has not previously encountered. The headaches are described as dull and constant, with intermittent sharp, painful episodes. He reports waking up with a headache this morning, which was exacerbated by lying down and relieved by sitting up. He has experienced tearing once or twice due to the pressure but reports no visual disturbances such as zigzags or flashes of bright light. He also reports no abnormal smells  "or tastes. A recent COVID-19 test was negative. He has been engaging in extensive house cleaning and moving activities since returning from [place], which he believes may be contributing to his symptoms. He does not consume caffeine, coffee, or sodas, but occasionally drinks root beer. His appetite remains unaffected. He was administered Reglan and Benadryl in the hospital for severe pain, which raised concerns about a potential serious underlying condition. He has a history of sinus headaches triggered by alcohol consumption, which can escalate to migraines. These are typically managed with Motrin or ibuprofen, although he has been advised against the latter due to kidney concerns. Despite taking Motrin, his current headache persists. He is not experiencing any worsening neck pain.    He is currently on rosuvastatin 10 mg for cholesterol management. Initially, he was resistant to this medication and attempted dietary modifications for a year, which resulted in a slight decrease in cholesterol levels, but not to the desired extent.    He had his right hip replaced in 2023. He reports no nausea, vomiting, abdominal pain, blood in urine or stool, or changes in hearing. He has a history of sharp pains in his wrists, knees, and ankles. He recently consulted an orthopedic surgeon and underwent comprehensive blood work.    PAST SURGICAL HISTORY:  Right hip replacement in 2023.    SOCIAL HISTORY  He does not drink alcohol.    FAMILY HISTORY  His mother used to get migraines.     Review of Systems   Constitutional: Negative.    Respiratory: Negative.     Cardiovascular: Negative.    Gastrointestinal: Negative.    Musculoskeletal:  Positive for arthralgias.   Neurological:  Positive for headaches.     Objective   /78 (BP Location: Left arm, Patient Position: Sitting, Cuff Size: Standard)   Pulse 84   Temp (!) 96.7 °F (35.9 °C) (Tympanic)   Resp 18   Ht 5' 6.75\" (1.695 m)   Wt 94.7 kg (208 lb 12.8 oz)   SpO2 95%  "  BMI 32.95 kg/m²     Physical Exam  Constitutional:       General: He is not in acute distress.     Appearance: He is not ill-appearing.   HENT:      Head: Normocephalic and atraumatic.      Right Ear: Tympanic membrane, ear canal and external ear normal. There is no impacted cerumen.      Left Ear: Tympanic membrane, ear canal and external ear normal. There is no impacted cerumen.     Cardiovascular:      Rate and Rhythm: Normal rate and regular rhythm.      Heart sounds: No murmur heard.  Pulmonary:      Effort: Pulmonary effort is normal. No respiratory distress.      Breath sounds: No wheezing.   Abdominal:      General: Bowel sounds are normal. There is no distension.      Tenderness: There is no abdominal tenderness.     Musculoskeletal:      Right lower leg: No edema.      Left lower leg: No edema.     Neurological:      General: No focal deficit present.      Mental Status: He is alert. Mental status is at baseline.      Cranial Nerves: No cranial nerve deficit.      Motor: No weakness.      Gait: Gait normal.     Psychiatric:         Mood and Affect: Mood normal.         Behavior: Behavior normal.       Nestor Mendez,

## 2025-06-03 ENCOUNTER — TELEPHONE (OUTPATIENT)
Age: 65
End: 2025-06-03

## 2025-06-03 DIAGNOSIS — R51.9 ACUTE INTRACTABLE HEADACHE, UNSPECIFIED HEADACHE TYPE: Primary | ICD-10-CM

## 2025-06-03 RX ORDER — PREDNISONE 20 MG/1
TABLET ORAL
Qty: 32 TABLET | Refills: 0 | Status: SHIPPED | OUTPATIENT
Start: 2025-06-03 | End: 2025-06-17

## 2025-06-03 NOTE — TELEPHONE ENCOUNTER
Emmanuel called to ask for recommendations for his headache. He has tried the Imitrex since Friday and has minimal relief. He is inquiring if he should continue of if any further treatment or medications can be recommended. [phone]    He also is asking if referral for Neurology could please be faxed to a headache specialist in [place] [facility]  Fax: [phone] Attention Dr Analisa Davenport

## 2025-06-04 ENCOUNTER — APPOINTMENT (OUTPATIENT)
Dept: LAB | Facility: CLINIC | Age: 65
End: 2025-06-04
Payer: MEDICARE

## 2025-06-04 DIAGNOSIS — R51.9 ACUTE INTRACTABLE HEADACHE, UNSPECIFIED HEADACHE TYPE: ICD-10-CM

## 2025-06-04 DIAGNOSIS — E78.00 HYPERCHOLESTEREMIA: ICD-10-CM

## 2025-06-04 LAB
CHOLEST SERPL-MCNC: 145 MG/DL (ref ?–200)
CRP SERPL QL: 9.7 MG/L
ERYTHROCYTE [SEDIMENTATION RATE] IN BLOOD: 15 MM/HOUR (ref 0–19)
HDLC SERPL-MCNC: 44 MG/DL
LDLC SERPL CALC-MCNC: 88 MG/DL (ref 0–100)
TRIGL SERPL-MCNC: 63 MG/DL (ref ?–150)

## 2025-06-04 PROCEDURE — 86618 LYME DISEASE ANTIBODY: CPT

## 2025-06-04 PROCEDURE — 80061 LIPID PANEL: CPT

## 2025-06-04 PROCEDURE — 36415 COLL VENOUS BLD VENIPUNCTURE: CPT

## 2025-06-04 PROCEDURE — 86140 C-REACTIVE PROTEIN: CPT

## 2025-06-04 PROCEDURE — 85652 RBC SED RATE AUTOMATED: CPT

## 2025-06-05 ENCOUNTER — TELEPHONE (OUTPATIENT)
Age: 65
End: 2025-06-05

## 2025-06-05 ENCOUNTER — RESULTS FOLLOW-UP (OUTPATIENT)
Age: 65
End: 2025-06-05

## 2025-06-05 LAB — B BURGDOR IGG+IGM SER QL IA: NEGATIVE

## 2025-06-05 NOTE — TELEPHONE ENCOUNTER
"Patient called to follow up on request for \"Urgency\" referral to Neurologist, Leonela Davenport.    Provided fax number of [phone].  Neurology  with Dr. Davenport's office recommends the wording \"Urgency\" be added to the referral in ensure prompt scheduling.    Please fax order with \"STAT and/or Urgency\" noted to [phone], and call patient back when completed.  "

## 2025-06-06 DIAGNOSIS — R51.9 ACUTE INTRACTABLE HEADACHE, UNSPECIFIED HEADACHE TYPE: ICD-10-CM

## 2025-06-06 RX ORDER — SUMATRIPTAN SUCCINATE 100 MG/1
100 TABLET ORAL ONCE AS NEEDED
Qty: 10 TABLET | Refills: 0 | OUTPATIENT
Start: 2025-06-06

## 2025-08-02 ENCOUNTER — TELEMEDICINE (OUTPATIENT)
Dept: OTHER | Facility: HOSPITAL | Age: 65
End: 2025-08-02
Payer: MEDICARE

## 2025-08-02 ENCOUNTER — NURSE TRIAGE (OUTPATIENT)
Dept: OTHER | Facility: OTHER | Age: 65
End: 2025-08-02

## 2025-08-02 VITALS — TEMPERATURE: 99.9 F

## 2025-08-02 DIAGNOSIS — J01.00 ACUTE NON-RECURRENT MAXILLARY SINUSITIS: Primary | ICD-10-CM

## 2025-08-02 PROBLEM — G43.909 MIGRAINE: Status: ACTIVE | Noted: 2025-08-02

## 2025-08-02 PROCEDURE — 99213 OFFICE O/P EST LOW 20 MIN: CPT | Performed by: PHYSICIAN ASSISTANT
